# Patient Record
Sex: FEMALE | Race: WHITE | Employment: FULL TIME | URBAN - METROPOLITAN AREA
[De-identification: names, ages, dates, MRNs, and addresses within clinical notes are randomized per-mention and may not be internally consistent; named-entity substitution may affect disease eponyms.]

---

## 2017-02-14 ENCOUNTER — OFFICE VISIT (OUTPATIENT)
Dept: ORTHOPEDIC SURGERY | Age: 47
End: 2017-02-14

## 2017-02-14 ENCOUNTER — HOSPITAL ENCOUNTER (OUTPATIENT)
Dept: GENERAL RADIOLOGY | Facility: MEDICAL CENTER | Age: 47
Discharge: HOME | End: 2017-02-14
Attending: ORTHOPAEDIC SURGERY | Admitting: ORTHOPAEDIC SURGERY

## 2017-02-14 VITALS
BODY MASS INDEX: 29.99 KG/M2 | HEIGHT: 65 IN | SYSTOLIC BLOOD PRESSURE: 116 MMHG | WEIGHT: 180 LBS | HEART RATE: 58 BPM | DIASTOLIC BLOOD PRESSURE: 77 MMHG

## 2017-02-14 DIAGNOSIS — M22.41 CHONDROMALACIA PATELLAE OF RIGHT KNEE: ICD-10-CM

## 2017-02-14 DIAGNOSIS — M25.561 CHRONIC PAIN OF RIGHT KNEE: ICD-10-CM

## 2017-02-14 DIAGNOSIS — G89.29 CHRONIC PAIN OF RIGHT KNEE: ICD-10-CM

## 2017-02-14 DIAGNOSIS — S83.231A COMPLEX TEAR OF MEDIAL MENISCUS OF RIGHT KNEE AS CURRENT INJURY, INITIAL ENCOUNTER: ICD-10-CM

## 2017-02-14 DIAGNOSIS — G89.29 CHRONIC PAIN OF RIGHT KNEE: Primary | ICD-10-CM

## 2017-02-14 DIAGNOSIS — M25.561 CHRONIC PAIN OF RIGHT KNEE: Primary | ICD-10-CM

## 2017-02-14 PROCEDURE — 73562 X-RAY EXAM OF KNEE 3: CPT | Performed by: ORTHOPAEDIC SURGERY

## 2017-02-14 PROCEDURE — 99203 OFFICE O/P NEW LOW 30 MIN: CPT | Performed by: ORTHOPAEDIC SURGERY

## 2017-02-24 ENCOUNTER — OFFICE VISIT (OUTPATIENT)
Dept: ORTHOPEDIC SURGERY | Age: 47
End: 2017-02-24

## 2017-02-24 VITALS
HEIGHT: 65 IN | HEART RATE: 64 BPM | DIASTOLIC BLOOD PRESSURE: 89 MMHG | WEIGHT: 180 LBS | BODY MASS INDEX: 29.99 KG/M2 | SYSTOLIC BLOOD PRESSURE: 133 MMHG

## 2017-02-24 DIAGNOSIS — M25.561 CHRONIC PAIN OF RIGHT KNEE: Primary | ICD-10-CM

## 2017-02-24 DIAGNOSIS — M22.41 CHONDROMALACIA PATELLAE OF RIGHT KNEE: ICD-10-CM

## 2017-02-24 DIAGNOSIS — G89.29 CHRONIC PAIN OF RIGHT KNEE: Primary | ICD-10-CM

## 2017-02-24 PROCEDURE — 99213 OFFICE O/P EST LOW 20 MIN: CPT | Performed by: ORTHOPAEDIC SURGERY

## 2017-12-12 ENCOUNTER — OFFICE VISIT (OUTPATIENT)
Dept: INTERNAL MEDICINE CLINIC | Age: 47
End: 2017-12-12

## 2017-12-12 VITALS
HEIGHT: 66 IN | HEART RATE: 70 BPM | DIASTOLIC BLOOD PRESSURE: 79 MMHG | OXYGEN SATURATION: 100 % | SYSTOLIC BLOOD PRESSURE: 106 MMHG | BODY MASS INDEX: 25.68 KG/M2 | WEIGHT: 159.8 LBS

## 2017-12-12 DIAGNOSIS — Z13.21 ENCOUNTER FOR SCREENING FOR NUTRITIONAL DISORDER: ICD-10-CM

## 2017-12-12 DIAGNOSIS — G35 MULTIPLE SCLEROSIS (HCC): Primary | ICD-10-CM

## 2017-12-12 DIAGNOSIS — D35.2 PITUITARY ADENOMA (HCC): ICD-10-CM

## 2017-12-12 DIAGNOSIS — Z13.220 LIPID SCREENING: ICD-10-CM

## 2017-12-12 PROCEDURE — 99213 OFFICE O/P EST LOW 20 MIN: CPT | Performed by: INTERNAL MEDICINE

## 2017-12-12 ASSESSMENT — PATIENT HEALTH QUESTIONNAIRE - PHQ9
1. LITTLE INTEREST OR PLEASURE IN DOING THINGS: 0
SUM OF ALL RESPONSES TO PHQ QUESTIONS 1-9: 0
2. FEELING DOWN, DEPRESSED OR HOPELESS: 0
SUM OF ALL RESPONSES TO PHQ9 QUESTIONS 1 & 2: 0

## 2017-12-12 NOTE — PROGRESS NOTES
[address] Internal Medicine  Patient Encounter   Velia Thomas MD    2017    Jalen Kirkland  :1970       Assessment/Plan:    Multiple sclerosis ([facility]  In remission. Not supplementing Vitamin D. Check level. -     Vitamin D 25 Hydroxy; Future  -     Comprehensive Metabolic Panel; Future    Pituitary adenoma ([facility] [address] [facility]  Asymptomatic. Check labs  -     TSH with Reflex; Future  -     CBC; Future    Following ketogenic diet  Lipid screening  Encounter for screening for nutritional disorder  -     MAGNESIUM; Future  -     HEAVY METALS, BLOOD; Future  -     VITAMIN C; Future    Discussed medications with patient who voiced understanding of their use and indications. All questions answered. Follow-up 1 year/prn     Medical assistant triage:  Chief Complaint   Patient presents with    Follow-up     had flu shot with work, will schedule pap     HPI:   Patient presents for follow-up of   1. Multiple sclerosis ([facility] [facility]    2. Pituitary adenoma ([facility]    3. Lipid screening    4. Encounter for screening for nutritional disorder         Health assessment in March BP was 100/72. High today . MS is quiet. Dr. Maddie Leach retired and is now seeing Dr. Oli Ly. In full remission. Off medications. Low level anxiety about brain health. Parents stripped lead pant of woodwork as child. Started ketogenic diet mid-late September. Does include vegetables and some fruits. Feels great. Wants labs checked to be sure is not having negative impact. Has gradually lost weight although wasn't the intent for the diet. More about brain health. Wants to check some labs since following this diet. Lipid march   Chol  190  Trig60  hdl75  ldl  Blood glucose 104    ROS  As per HPI. Past Medical History:   Diagnosis Date    Allergic     Hypercholesterolemia 2017    Multiple sclerosis ([facility] [address] [facility]     Dr. Chad Vila    Neuromuscular disorder [facility])     Pituitary adenoma [facility])      Prior to Visit Medications    Medication Sig Taking?

## 2017-12-14 PROBLEM — E78.00 HYPERCHOLESTEROLEMIA: Status: ACTIVE | Noted: 2017-12-14

## 2019-01-08 LAB
CHOLESTEROL, TOTAL: 203 MG/DL
CHOLESTEROL/HDL RATIO: ABNORMAL
HDLC SERPL-MCNC: 97 MG/DL (ref 35–70)
LDL CHOLESTEROL CALCULATED: 95 MG/DL (ref 0–160)
TRIGL SERPL-MCNC: 55 MG/DL
VLDLC SERPL CALC-MCNC: ABNORMAL MG/DL

## 2019-03-13 ENCOUNTER — PATIENT MESSAGE (OUTPATIENT)
Dept: INTERNAL MEDICINE CLINIC | Age: 49
End: 2019-03-13

## 2019-04-16 ENCOUNTER — OFFICE VISIT (OUTPATIENT)
Dept: INTERNAL MEDICINE CLINIC | Age: 49
End: 2019-04-16
Payer: COMMERCIAL

## 2019-04-16 VITALS
WEIGHT: 152 LBS | BODY MASS INDEX: 24.91 KG/M2 | OXYGEN SATURATION: 98 % | SYSTOLIC BLOOD PRESSURE: 112 MMHG | DIASTOLIC BLOOD PRESSURE: 64 MMHG | HEART RATE: 60 BPM

## 2019-04-16 DIAGNOSIS — F41.9 ANXIETY: Primary | ICD-10-CM

## 2019-04-16 DIAGNOSIS — G35 MULTIPLE SCLEROSIS (HCC): ICD-10-CM

## 2019-04-16 DIAGNOSIS — E78.00 HYPERCHOLESTEROLEMIA: ICD-10-CM

## 2019-04-16 DIAGNOSIS — G47.00 INSOMNIA, UNSPECIFIED TYPE: ICD-10-CM

## 2019-04-16 PROCEDURE — 90632 HEPA VACCINE ADULT IM: CPT | Performed by: INTERNAL MEDICINE

## 2019-04-16 PROCEDURE — 90471 IMMUNIZATION ADMIN: CPT | Performed by: INTERNAL MEDICINE

## 2019-04-16 PROCEDURE — 99214 OFFICE O/P EST MOD 30 MIN: CPT | Performed by: INTERNAL MEDICINE

## 2019-04-16 RX ORDER — ALPRAZOLAM 0.25 MG/1
0.25 TABLET ORAL NIGHTLY PRN
Qty: 30 TABLET | Refills: 0 | Status: SHIPPED | OUTPATIENT
Start: 2019-04-16 | End: 2020-02-18 | Stop reason: SDUPTHER

## 2019-04-16 RX ORDER — EPINEPHRINE 0.3 MG/.3ML
0.3 INJECTION SUBCUTANEOUS ONCE
Qty: 0.3 ML | Refills: 0 | Status: SHIPPED | OUTPATIENT
Start: 2019-04-16 | End: 2019-04-16

## 2019-04-16 RX ORDER — AZITHROMYCIN 250 MG/1
TABLET, FILM COATED ORAL
Qty: 6 TABLET | Refills: 0 | Status: SHIPPED | OUTPATIENT
Start: 2019-04-16 | End: 2019-04-16

## 2019-04-16 RX ORDER — AZITHROMYCIN 500 MG/1
500 TABLET, FILM COATED ORAL DAILY
Qty: 3 TABLET | Refills: 0 | Status: SHIPPED | OUTPATIENT
Start: 2019-04-16 | End: 2019-04-19

## 2019-04-16 ASSESSMENT — PATIENT HEALTH QUESTIONNAIRE - PHQ9
SUM OF ALL RESPONSES TO PHQ QUESTIONS 1-9: 0
1. LITTLE INTEREST OR PLEASURE IN DOING THINGS: 0
2. FEELING DOWN, DEPRESSED OR HOPELESS: 0
SUM OF ALL RESPONSES TO PHQ9 QUESTIONS 1 & 2: 0
SUM OF ALL RESPONSES TO PHQ QUESTIONS 1-9: 0

## 2019-04-16 NOTE — PATIENT INSTRUCTIONS
Psychologist:  César Alvarado [phone]  Yudy Romo [phone]      [facility]  [phone]

## 2019-04-16 NOTE — PROGRESS NOTES
[facility] Primary Care  Internal Medicine Progress Note  Nuria Justin MD  2019    Socorro Doing  :1970    ASSESSMENT/PLAN:   Anxiety  Intermittent, with panic. Ok to continue to use xanax prn, however if frequency increase, would change to maintenance type medication.   -     TSH with Reflex; Future  -     ALPRAZolam (XANAX) 0.25 MG tablet; Take 1 tablet by mouth nightly as needed for Anxiety for up to 30 days. Insomnia, unspecified type  Relates to intermittent anxiety. Breathing and relaxation techniques reviewed. Xanax 0.25 mg prn. RX Monitoring 2019   Attestation The Prescription Monitoring Report for this patient was reviewed today. Chronic Pain Routine Monitoring No signs of potential drug abuse or diversion identified: otherwise, see note documentation     -     ALPRAZolam (XANAX) 0.25 MG tablet; Take 1 tablet by mouth nightly as needed for Anxiety for up to 30 days. Multiple sclerosis ([address]  Currently asymptomatic however has been encouraged to restart treatment preventivelty. Following with new neuro, Dr. Mesha Castro at [facility]. Hypercholesterolemia  Recent labs much improved. Travel  [place] are [place]  Hep A  zithromycin for travel  Epipen for shrimp allergy. Discussed medications with patient who voiced understanding of their use and indications. All questions answered. Medical assistant triage:   Chief Complaint   Patient presents with    Anxiety       HPI:   57 y.o. female here today for follow-up. Anxiety issues and not see in over a year. Had requested xanax refill. Initially prescribed when ad MS dx and uses for premed for MRI. Prior to MS, had panic attacks. Had 2 ER visits. Rarely has panic attacks now, but keeps prescription as security blanket. Lately doing ok during day but racing thoughts and sometimes even racing heart at night. Will get caught up in little thought loops. Happening a couple times/month. Will happen in spurts.     Job is demanding but loves it.  is alcoholic, and getting worse over time. New neurologist, at [facility]. Dr. Klaudia Tubbs. Doesn't like the practice and scheduling. Recent labs at work ad was told cholesterol was \"good\". Still mostly following Keto. Glucose was normal also. Had labs at [facility] in January. Lipids better. Pap scheduled for July. Remote travel in [place] this summer. Would like Abx, and epi pen since shrimp allergy. Review of Systems As per HPI. Prior to Visit Medications    Medication Sig Taking? Authorizing Provider   Cyanocobalamin (B-12 PO) Take by mouth Yes Historical Provider, MD   Cholecalciferol (VITAMIN D3) 5000 units TABS Take by mouth Yes Historical Provider, MD   MULTIPLE VITAMIN PO Take by mouth Yes Historical Provider, MD   BIOTIN PO Take by mouth Yes Historical Provider, MD   azithromycin (ZITHROMAX Z-LISA) 250 MG tablet Take 2 tablets daily on day 1, then 1 tablet daily on days 2-5 for traveler's diarrhea. Yes Hu Sheriff MD   EPINEPHrine (EPIPEN 2-LISA) 0.3 MG/0.3ML SOAJ injection Inject 0.3 mLs into the muscle once for 1 dose Use as directed for allergic reaction Yes Hu Sheriff MD   ALPRAZolam Shan Ten) 0.25 MG tablet Take 1 tablet by mouth nightly as needed for Anxiety for up to 30 days. Yes Hu Sheriff MD     Social History     Tobacco Use   Smoking Status Former Smoker    Types: Cigarettes    Last attempt to quit: 10/3/2002    Years since quittin.5   Smokeless Tobacco Never Used   Tobacco Comment    passive smoke exposure       OBJECTIVE:  BP Readings from Last 3 Encounters:   19 112/64   17 106/79   17 133/89      Wt Readings from Last 3 Encounters:   19 152 lb (68.9 kg)   17 159 lb 12.8 oz (72.5 kg)   17 180 lb (81.6 kg)     Vitals:    19 1028   BP: 112/64   Pulse: 60   SpO2: 98%   Weight: 152 lb (68.9 kg)     Body mass index is 24.91 kg/m².   Physical Exam

## 2019-05-20 ENCOUNTER — TELEPHONE (OUTPATIENT)
Dept: INTERNAL MEDICINE CLINIC | Age: 49
End: 2019-05-20

## 2019-05-20 NOTE — TELEPHONE ENCOUNTER
Pt had a very tight schedule and unfortunately office schedule did not work for her. I mentioned urgent care that may be convenient with her hours. She voiced understanding. She would zuleima lbk if her schedule opened up more.

## 2019-05-20 NOTE — TELEPHONE ENCOUNTER
Patient is requesting to be seen by Dr. Nolasco Pleasant Wednesday or Later this week. She states she has a Sinus infection, and used a netti pot, blew her nose, heard a pop, her hearing hasn't been the same since, and she is having notable pain in that ear (left)  She is fearful that she has a perforated ear drum, and wants this taken care of because [place] on [place]. Aware doctor  is out of the office today  Please contact patient @ phone # provided.

## 2020-03-04 ENCOUNTER — OFFICE VISIT (OUTPATIENT)
Dept: CARDIOLOGY CLINIC | Age: 50
End: 2020-03-04
Payer: COMMERCIAL

## 2020-03-04 VITALS
HEART RATE: 60 BPM | DIASTOLIC BLOOD PRESSURE: 60 MMHG | BODY MASS INDEX: 24.75 KG/M2 | WEIGHT: 151 LBS | SYSTOLIC BLOOD PRESSURE: 118 MMHG

## 2020-03-04 PROCEDURE — 99203 OFFICE O/P NEW LOW 30 MIN: CPT | Performed by: INTERNAL MEDICINE

## 2020-03-04 PROCEDURE — 93000 ELECTROCARDIOGRAM COMPLETE: CPT | Performed by: INTERNAL MEDICINE

## 2020-03-04 ASSESSMENT — ENCOUNTER SYMPTOMS
COUGH: 0
CHOKING: 0
SHORTNESS OF BREATH: 0
CHEST TIGHTNESS: 1

## 2020-03-04 NOTE — PROGRESS NOTES
file     Relationship status: Not on file    Intimate partner violence:     Fear of current or ex partner: Not on file     Emotionally abused: Not on file     Physically abused: Not on file     Forced sexual activity: Not on file   Other Topics Concern    Not on file   Social History Narrative    Not on file     FH reviewed. Mo valve issue. Review of Systems   Constitutional: Positive for fatigue. Negative for activity change and appetite change. Respiratory: Positive for chest tightness. Negative for cough, choking and shortness of breath. Cardiovascular: Negative for chest pain, palpitations and leg swelling. Denies PND or orthopnea. No tachycardia or syncope. Neurological: Negative for dizziness, syncope and light-headedness. Psychiatric/Behavioral: Negative for agitation, behavioral problems and confusion. All other systems reviewed and are negative. Objective:   Physical Exam  Constitutional:       General: She is not in acute distress. Appearance: Normal appearance. She is well-developed. HENT:      Head: Normocephalic and atraumatic. Right Ear: External ear normal.      Left Ear: External ear normal.   Neck:      Musculoskeletal: Normal range of motion. Vascular: No JVD. Cardiovascular:      Rate and Rhythm: Normal rate and regular rhythm. Heart sounds: Normal heart sounds. No murmur. No gallop. Pulmonary:      Effort: Pulmonary effort is normal. No respiratory distress. Breath sounds: Normal breath sounds. No wheezing or rales. Abdominal:      General: Bowel sounds are normal.      Palpations: Abdomen is soft. Tenderness: There is no abdominal tenderness. Musculoskeletal: Normal range of motion. Skin:     General: Skin is warm and dry. Neurological:      General: No focal deficit present. Mental Status: She is alert and oriented to person, place, and time.    Psychiatric:         Mood and Affect: Mood normal.         Behavior:

## 2020-09-01 ENCOUNTER — OFFICE VISIT (OUTPATIENT)
Dept: ORTHOPEDIC SURGERY | Age: 50
End: 2020-09-01
Payer: COMMERCIAL

## 2020-09-01 VITALS — BODY MASS INDEX: 24.27 KG/M2 | HEIGHT: 66 IN | WEIGHT: 151.01 LBS

## 2020-09-01 PROCEDURE — 99243 OFF/OP CNSLTJ NEW/EST LOW 30: CPT | Performed by: ORTHOPAEDIC SURGERY

## 2020-09-01 RX ORDER — HYDROCODONE BITARTRATE AND ACETAMINOPHEN 5; 325 MG/1; MG/1
1 TABLET ORAL EVERY 6 HOURS PRN
Qty: 28 TABLET | Refills: 0 | Status: SHIPPED | OUTPATIENT
Start: 2020-09-01 | End: 2020-09-08

## 2020-09-01 RX ORDER — CEPHALEXIN 500 MG/1
500 CAPSULE ORAL 4 TIMES DAILY
Qty: 8 CAPSULE | Refills: 0 | Status: SHIPPED | OUTPATIENT
Start: 2020-09-01 | End: 2020-09-03

## 2020-09-01 NOTE — PROGRESS NOTES
Chief Complaint    No chief complaint on file. History of Present Illness:  Sole Chandler is a 54 y.o. female who I was asked to see in consultation by Dr. Ray Prado for evaluation of they are chief complaint of right ankle pain. She states on 8/28/2020 she was hiking and she slipped on some allergy on an incline and twisted her right ankle. She felt a pop and had immediate pain laterally. She had isolated injury to the right ankle. She was seen by another physician and is now here for second opinion. She denies any other injuries and states that she is always clumsy but normally does a good job of falling. This time she did not. She is an     Medical History:  Patient's medications, allergies, past medical, surgical, social and family histories were reviewed and updated as appropriate. Review of Systems:  Pertinent items are noted in HPI  Review of systems reviewed from Patient History Form dated on 9/1/2020 and available in the patient's chart under the Media tab. Vital Signs: There were no vitals taken for this visit. General Exam:   Constitutional: Patient is adequately groomed with no evidence of malnutrition  DTRs: Deep tendon reflexes are intact  Mental Status: The patient is oriented to time, place and person. The patient's mood and affect are appropriate. Lymphatic: The lymphatic examination bilaterally reveals all areas to be without enlargement or induration. Ankle Examination:    Inspection: Mild swelling right ankle no fracture blisters    Palpation: Tenderness over the lateral ankle no pain over the medial side    Range of Motion: 10 degrees of dorsiflexion 30 degrees of plantarflexion    Strength: No focal deficit    Special Tests: No evidence of DVT or compartment syndrome    Skin: There are no rashes, ulcerations or lesions.     Gait: Antalgic    Reflex 2+ and symmetric    Additional Comments:       Additional Examinations:         Left Lower Extremity: Examination of the left lower extremity does not show any tenderness, deformity or injury. Range of motion is unremarkable. There is no gross instability. There are no rashes, ulcerations or lesions. Strength and tone are normal.     Radiology:     X-rays obtained and reviewed in office:  Views 3  Location right ankle  Impression obtained previously at another facility shows a distal fibula torus fracture    Assessment : This patient has a right distal fibula fracture      Office Procedures:  No orders of the defined types were placed in this encounter. Treatment Plan:  The etiology of distal fibula fracture was discussed in great detail including the nonoperative and operative options. All questions were answered. Nonoperative option includes activity modification, immobilization with cast or boot, support using brace shoes and inserts, medicines when appropriate, physical therapy, injections when indicated, and topicals. Operative option includes open reduction internal fixation right distal fibula fracture. The patient will start on the following treatment regimen: We discussed operative and nonoperative treatments. All questions were answered no guarantees were given or implied. I gave her Polysporin that she will use on the lateral ankle until her surgery day and will followup with me postop    I have evaluated the patient myself and completed the examination of the patient on date of visit.  Have discussed the case and reviewed all pertinent data with the patient

## 2020-09-02 ENCOUNTER — TELEPHONE (OUTPATIENT)
Dept: ORTHOPEDIC SURGERY | Age: 50
End: 2020-09-02

## 2020-09-02 NOTE — TELEPHONE ENCOUNTER
Auth: # [id number]    Date: 09/04/2020 - 11/04/2020  Type of SX:  OP  Location: [facility]  CPT: 84805   DX Code: K04.086Z  SX area: Orif right fibula fracture  [facility]

## 2020-09-02 NOTE — PROGRESS NOTES
Obstructive Sleep Apnea (PEDRO) Screening     Patient:  Randa Hughes    YOB: 1970      Medical Record #:  [id number]                     Date:  9/2/2020     1. Are you a loud and/or regular snorer? []  Yes       [x] No    2. Have you been observed to gasp or stop breathing during sleep? []  Yes       [x] No    3. Do you feel tired or groggy upon awakening or do you awaken with a headache?           []  Yes       [] No    4. Are you often tired or fatigued during the wake time hours? []  Yes       [] No    5. Do you fall asleep sitting, reading, watching TV or driving? []  Yes       [] No    6. Do you often have problems with memory or concentration? []  Yes       [] No    **If patient's score is ? 3 they are considered high risk for PEDRO. An Anesthesia provider will evaluate the patient and develop a plan of care the day of surgery. Note:  If the patient's BMI is more than 35 kg m¯² , has neck circumference > 40 cm, and/or high blood pressure the risk is greater (© American Sleep Apnea Association, 2006).

## 2020-09-03 ENCOUNTER — OFFICE VISIT (OUTPATIENT)
Dept: PRIMARY CARE CLINIC | Age: 50
End: 2020-09-03
Payer: COMMERCIAL

## 2020-09-03 ENCOUNTER — ANESTHESIA EVENT (OUTPATIENT)
Dept: OPERATING ROOM | Age: 50
End: 2020-09-03
Payer: COMMERCIAL

## 2020-09-03 LAB
ANION GAP SERPL CALCULATED.3IONS-SCNC: 10 MMOL/L (ref 3–16)
BUN BLDV-MCNC: 18 MG/DL (ref 7–20)
CALCIUM SERPL-MCNC: 9.5 MG/DL (ref 8.3–10.6)
CHLORIDE BLD-SCNC: 104 MMOL/L (ref 99–110)
CO2: 25 MMOL/L (ref 21–32)
CREAT SERPL-MCNC: 0.8 MG/DL (ref 0.6–1.1)
GFR AFRICAN AMERICAN: >60
GFR NON-AFRICAN AMERICAN: >60
GLUCOSE BLD-MCNC: 89 MG/DL (ref 70–99)
POTASSIUM SERPL-SCNC: 4.9 MMOL/L (ref 3.5–5.1)
SODIUM BLD-SCNC: 139 MMOL/L (ref 136–145)

## 2020-09-03 PROCEDURE — 99211 OFF/OP EST MAY X REQ PHY/QHP: CPT | Performed by: NURSE PRACTITIONER

## 2020-09-03 NOTE — PROGRESS NOTES
Shantal Lettyil received a viral test for COVID-19. They were educated on isolation and quarantine as appropriate. For any symptoms, they were directed to seek care from their PCP, given contact information to establish with a doctor, directed to an urgent care or the emergency room.

## 2020-09-03 NOTE — PATIENT INSTRUCTIONS

## 2020-09-04 ENCOUNTER — ANESTHESIA (OUTPATIENT)
Dept: OPERATING ROOM | Age: 50
End: 2020-09-04
Payer: COMMERCIAL

## 2020-09-04 ENCOUNTER — HOSPITAL ENCOUNTER (OUTPATIENT)
Age: 50
Setting detail: OUTPATIENT SURGERY
Discharge: HOME | End: 2020-09-04
Attending: ORTHOPAEDIC SURGERY | Admitting: ORTHOPAEDIC SURGERY
Payer: COMMERCIAL

## 2020-09-04 VITALS — SYSTOLIC BLOOD PRESSURE: 109 MMHG | DIASTOLIC BLOOD PRESSURE: 72 MMHG | OXYGEN SATURATION: 100 % | TEMPERATURE: 96.8 F

## 2020-09-04 VITALS
DIASTOLIC BLOOD PRESSURE: 77 MMHG | BODY MASS INDEX: 25.99 KG/M2 | RESPIRATION RATE: 16 BRPM | WEIGHT: 156 LBS | HEIGHT: 65 IN | TEMPERATURE: 98.4 F | SYSTOLIC BLOOD PRESSURE: 111 MMHG | OXYGEN SATURATION: 100 % | HEART RATE: 54 BPM

## 2020-09-04 LAB
PREGNANCY, URINE: NEGATIVE
SARS-COV-2, NAA: NOT DETECTED

## 2020-09-04 PROCEDURE — 84703 CHORIONIC GONADOTROPIN ASSAY: CPT

## 2020-09-04 PROCEDURE — 2500000003 HC RX 250 WO HCPCS: Performed by: NURSE ANESTHETIST, CERTIFIED REGISTERED

## 2020-09-04 PROCEDURE — C1713 ANCHOR/SCREW BN/BN,TIS/BN: HCPCS | Performed by: ORTHOPAEDIC SURGERY

## 2020-09-04 PROCEDURE — 7100000000 HC PACU RECOVERY - FIRST 15 MIN: Performed by: ORTHOPAEDIC SURGERY

## 2020-09-04 PROCEDURE — 3700000000 HC ANESTHESIA ATTENDED CARE: Performed by: ORTHOPAEDIC SURGERY

## 2020-09-04 PROCEDURE — 2580000003 HC RX 258: Performed by: ANESTHESIOLOGY

## 2020-09-04 PROCEDURE — 2500000003 HC RX 250 WO HCPCS: Performed by: ANESTHESIOLOGY

## 2020-09-04 PROCEDURE — 7100000010 HC PHASE II RECOVERY - FIRST 15 MIN: Performed by: ORTHOPAEDIC SURGERY

## 2020-09-04 PROCEDURE — 64445 NJX AA&/STRD SCIATIC NRV IMG: CPT | Performed by: ANESTHESIOLOGY

## 2020-09-04 PROCEDURE — 3600000013 HC SURGERY LEVEL 3 ADDTL 15MIN: Performed by: ORTHOPAEDIC SURGERY

## 2020-09-04 PROCEDURE — 7100000011 HC PHASE II RECOVERY - ADDTL 15 MIN: Performed by: ORTHOPAEDIC SURGERY

## 2020-09-04 PROCEDURE — 7100000001 HC PACU RECOVERY - ADDTL 15 MIN: Performed by: ORTHOPAEDIC SURGERY

## 2020-09-04 PROCEDURE — 6360000002 HC RX W HCPCS: Performed by: ANESTHESIOLOGY

## 2020-09-04 PROCEDURE — 6360000002 HC RX W HCPCS: Performed by: ORTHOPAEDIC SURGERY

## 2020-09-04 PROCEDURE — 6360000002 HC RX W HCPCS: Performed by: NURSE ANESTHETIST, CERTIFIED REGISTERED

## 2020-09-04 PROCEDURE — 64447 NJX AA&/STRD FEMORAL NRV IMG: CPT | Performed by: ANESTHESIOLOGY

## 2020-09-04 PROCEDURE — 2709999900 HC NON-CHARGEABLE SUPPLY: Performed by: ORTHOPAEDIC SURGERY

## 2020-09-04 PROCEDURE — 3700000001 HC ADD 15 MINUTES (ANESTHESIA): Performed by: ORTHOPAEDIC SURGERY

## 2020-09-04 PROCEDURE — 3600000003 HC SURGERY LEVEL 3 BASE: Performed by: ORTHOPAEDIC SURGERY

## 2020-09-04 DEVICE — SCREW BNE L24MM DIA3.5MM CORT ANK S STL NONLOCKING LO PROF: Type: IMPLANTABLE DEVICE | Site: ANKLE | Status: FUNCTIONAL

## 2020-09-04 DEVICE — SCREW BNE L14MM DIA3.5MM CORT ANK S STL LOK FULL THRD LO: Type: IMPLANTABLE DEVICE | Site: ANKLE | Status: FUNCTIONAL

## 2020-09-04 DEVICE — SCREW BNE L14MM DIA2.7MM ANK S STL LOK LO PROF FOR FRAC: Type: IMPLANTABLE DEVICE | Site: ANKLE | Status: FUNCTIONAL

## 2020-09-04 DEVICE — SCREW BNE L12MM DIA2.7MM ANK S STL LOK LO PROF FOR FRAC: Type: IMPLANTABLE DEVICE | Site: ANKLE | Status: FUNCTIONAL

## 2020-09-04 DEVICE — IMPLANTABLE DEVICE: Type: IMPLANTABLE DEVICE | Site: ANKLE | Status: FUNCTIONAL

## 2020-09-04 DEVICE — SCREW BNE L14MM DIA3.5MM CORT ANK S STL NONLOCKING LO PROF: Type: IMPLANTABLE DEVICE | Site: ANKLE | Status: FUNCTIONAL

## 2020-09-04 DEVICE — SCREW BNE L16MM DIA2.7MM ANK S STL LOK LO PROF FOR FRAC: Type: IMPLANTABLE DEVICE | Site: ANKLE | Status: FUNCTIONAL

## 2020-09-04 RX ORDER — FENTANYL CITRATE 50 UG/ML
25 INJECTION, SOLUTION INTRAMUSCULAR; INTRAVENOUS EVERY 5 MIN PRN
Status: DISCONTINUED | OUTPATIENT
Start: 2020-09-04 | End: 2020-09-04 | Stop reason: HOSPADM

## 2020-09-04 RX ORDER — DEXAMETHASONE SODIUM PHOSPHATE 4 MG/ML
INJECTION, SOLUTION INTRA-ARTICULAR; INTRALESIONAL; INTRAMUSCULAR; INTRAVENOUS; SOFT TISSUE PRN
Status: DISCONTINUED | OUTPATIENT
Start: 2020-09-04 | End: 2020-09-04 | Stop reason: SDUPTHER

## 2020-09-04 RX ORDER — BUPIVACAINE HYDROCHLORIDE AND EPINEPHRINE 5; 5 MG/ML; UG/ML
INJECTION, SOLUTION EPIDURAL; INTRACAUDAL; PERINEURAL PRN
Status: DISCONTINUED | OUTPATIENT
Start: 2020-09-04 | End: 2020-09-04 | Stop reason: SDUPTHER

## 2020-09-04 RX ORDER — SODIUM CHLORIDE, SODIUM LACTATE, POTASSIUM CHLORIDE, CALCIUM CHLORIDE 600; 310; 30; 20 MG/100ML; MG/100ML; MG/100ML; MG/100ML
INJECTION, SOLUTION INTRAVENOUS CONTINUOUS
Status: DISCONTINUED | OUTPATIENT
Start: 2020-09-04 | End: 2020-09-04 | Stop reason: HOSPADM

## 2020-09-04 RX ORDER — HYDRALAZINE HYDROCHLORIDE 20 MG/ML
5 INJECTION INTRAMUSCULAR; INTRAVENOUS EVERY 10 MIN PRN
Status: DISCONTINUED | OUTPATIENT
Start: 2020-09-04 | End: 2020-09-04 | Stop reason: HOSPADM

## 2020-09-04 RX ORDER — SODIUM CHLORIDE 0.9 % (FLUSH) 0.9 %
10 SYRINGE (ML) INJECTION EVERY 12 HOURS SCHEDULED
Status: DISCONTINUED | OUTPATIENT
Start: 2020-09-04 | End: 2020-09-04 | Stop reason: HOSPADM

## 2020-09-04 RX ORDER — PROMETHAZINE HYDROCHLORIDE 25 MG/ML
6.25 INJECTION, SOLUTION INTRAMUSCULAR; INTRAVENOUS
Status: DISCONTINUED | OUTPATIENT
Start: 2020-09-04 | End: 2020-09-04 | Stop reason: HOSPADM

## 2020-09-04 RX ORDER — PROPOFOL 10 MG/ML
INJECTION, EMULSION INTRAVENOUS CONTINUOUS PRN
Status: DISCONTINUED | OUTPATIENT
Start: 2020-09-04 | End: 2020-09-04 | Stop reason: SDUPTHER

## 2020-09-04 RX ORDER — ONDANSETRON 2 MG/ML
INJECTION INTRAMUSCULAR; INTRAVENOUS PRN
Status: DISCONTINUED | OUTPATIENT
Start: 2020-09-04 | End: 2020-09-04 | Stop reason: SDUPTHER

## 2020-09-04 RX ORDER — PROPOFOL 10 MG/ML
INJECTION, EMULSION INTRAVENOUS PRN
Status: DISCONTINUED | OUTPATIENT
Start: 2020-09-04 | End: 2020-09-04 | Stop reason: SDUPTHER

## 2020-09-04 RX ORDER — MIDAZOLAM HYDROCHLORIDE 1 MG/ML
INJECTION INTRAMUSCULAR; INTRAVENOUS PRN
Status: DISCONTINUED | OUTPATIENT
Start: 2020-09-04 | End: 2020-09-04 | Stop reason: SDUPTHER

## 2020-09-04 RX ORDER — LIDOCAINE HYDROCHLORIDE 10 MG/ML
1 INJECTION, SOLUTION EPIDURAL; INFILTRATION; INTRACAUDAL; PERINEURAL
Status: COMPLETED | OUTPATIENT
Start: 2020-09-04 | End: 2020-09-04

## 2020-09-04 RX ORDER — SODIUM CHLORIDE 0.9 % (FLUSH) 0.9 %
10 SYRINGE (ML) INJECTION PRN
Status: DISCONTINUED | OUTPATIENT
Start: 2020-09-04 | End: 2020-09-04 | Stop reason: HOSPADM

## 2020-09-04 RX ORDER — ONDANSETRON 2 MG/ML
4 INJECTION INTRAMUSCULAR; INTRAVENOUS
Status: DISCONTINUED | OUTPATIENT
Start: 2020-09-04 | End: 2020-09-04 | Stop reason: HOSPADM

## 2020-09-04 RX ORDER — FENTANYL CITRATE 50 UG/ML
INJECTION, SOLUTION INTRAMUSCULAR; INTRAVENOUS PRN
Status: DISCONTINUED | OUTPATIENT
Start: 2020-09-04 | End: 2020-09-04 | Stop reason: SDUPTHER

## 2020-09-04 RX ORDER — LIDOCAINE HYDROCHLORIDE 20 MG/ML
INJECTION, SOLUTION EPIDURAL; INFILTRATION; INTRACAUDAL; PERINEURAL PRN
Status: DISCONTINUED | OUTPATIENT
Start: 2020-09-04 | End: 2020-09-04 | Stop reason: SDUPTHER

## 2020-09-04 RX ORDER — OXYCODONE HYDROCHLORIDE AND ACETAMINOPHEN 5; 325 MG/1; MG/1
1 TABLET ORAL PRN
Status: DISCONTINUED | OUTPATIENT
Start: 2020-09-04 | End: 2020-09-04 | Stop reason: HOSPADM

## 2020-09-04 RX ORDER — MEPERIDINE HYDROCHLORIDE 50 MG/ML
12.5 INJECTION INTRAMUSCULAR; INTRAVENOUS; SUBCUTANEOUS EVERY 5 MIN PRN
Status: DISCONTINUED | OUTPATIENT
Start: 2020-09-04 | End: 2020-09-04 | Stop reason: HOSPADM

## 2020-09-04 RX ORDER — OXYCODONE HYDROCHLORIDE AND ACETAMINOPHEN 5; 325 MG/1; MG/1
2 TABLET ORAL PRN
Status: DISCONTINUED | OUTPATIENT
Start: 2020-09-04 | End: 2020-09-04 | Stop reason: HOSPADM

## 2020-09-04 RX ADMIN — BUPIVACAINE HYDROCHLORIDE AND EPINEPHRINE 10 ML: 5; 5 INJECTION, SOLUTION EPIDURAL; INTRACAUDAL; PERINEURAL at 12:53

## 2020-09-04 RX ADMIN — PROPOFOL 40 MG: 10 INJECTION, EMULSION INTRAVENOUS at 14:43

## 2020-09-04 RX ADMIN — MIDAZOLAM HYDROCHLORIDE 2 MG: 2 INJECTION, SOLUTION INTRAMUSCULAR; INTRAVENOUS at 12:49

## 2020-09-04 RX ADMIN — BUPIVACAINE HYDROCHLORIDE AND EPINEPHRINE 10 ML: 5; 5 INJECTION, SOLUTION EPIDURAL; INTRACAUDAL; PERINEURAL at 12:50

## 2020-09-04 RX ADMIN — SODIUM CHLORIDE, POTASSIUM CHLORIDE, SODIUM LACTATE AND CALCIUM CHLORIDE: 600; 310; 30; 20 INJECTION, SOLUTION INTRAVENOUS at 14:31

## 2020-09-04 RX ADMIN — ONDANSETRON 4 MG: 2 INJECTION INTRAMUSCULAR; INTRAVENOUS at 13:51

## 2020-09-04 RX ADMIN — PROPOFOL 40 MG: 10 INJECTION, EMULSION INTRAVENOUS at 14:40

## 2020-09-04 RX ADMIN — PROPOFOL 40 MG: 10 INJECTION, EMULSION INTRAVENOUS at 14:37

## 2020-09-04 RX ADMIN — BUPIVACAINE HYDROCHLORIDE AND EPINEPHRINE 10 ML: 5; 5 INJECTION, SOLUTION EPIDURAL; INTRACAUDAL; PERINEURAL at 12:51

## 2020-09-04 RX ADMIN — FENTANYL CITRATE 100 MCG: 50 INJECTION INTRAMUSCULAR; INTRAVENOUS at 12:49

## 2020-09-04 RX ADMIN — SODIUM CHLORIDE, POTASSIUM CHLORIDE, SODIUM LACTATE AND CALCIUM CHLORIDE: 600; 310; 30; 20 INJECTION, SOLUTION INTRAVENOUS at 12:56

## 2020-09-04 RX ADMIN — PROPOFOL 200 MG: 10 INJECTION, EMULSION INTRAVENOUS at 13:51

## 2020-09-04 RX ADMIN — PROPOFOL 20 MG: 10 INJECTION, EMULSION INTRAVENOUS at 14:34

## 2020-09-04 RX ADMIN — BUPIVACAINE HYDROCHLORIDE AND EPINEPHRINE 10 ML: 5; 5 INJECTION, SOLUTION EPIDURAL; INTRACAUDAL; PERINEURAL at 12:54

## 2020-09-04 RX ADMIN — SODIUM CHLORIDE, POTASSIUM CHLORIDE, SODIUM LACTATE AND CALCIUM CHLORIDE: 600; 310; 30; 20 INJECTION, SOLUTION INTRAVENOUS at 13:48

## 2020-09-04 RX ADMIN — PROPOFOL 150 MCG/KG/MIN: 10 INJECTION, EMULSION INTRAVENOUS at 14:13

## 2020-09-04 RX ADMIN — DEXAMETHASONE SODIUM PHOSPHATE 8 MG: 4 INJECTION, SOLUTION INTRAMUSCULAR; INTRAVENOUS at 13:51

## 2020-09-04 RX ADMIN — LIDOCAINE HYDROCHLORIDE 80 MG: 20 INJECTION, SOLUTION EPIDURAL; INFILTRATION; INTRACAUDAL; PERINEURAL at 13:51

## 2020-09-04 RX ADMIN — LIDOCAINE HYDROCHLORIDE 1 ML: 10 INJECTION, SOLUTION EPIDURAL; INFILTRATION; INTRACAUDAL; PERINEURAL at 12:56

## 2020-09-04 RX ADMIN — BUPIVACAINE HYDROCHLORIDE AND EPINEPHRINE 10 ML: 5; 5 INJECTION, SOLUTION EPIDURAL; INTRACAUDAL; PERINEURAL at 12:52

## 2020-09-04 RX ADMIN — CEFAZOLIN SODIUM 2 G: 10 INJECTION, POWDER, FOR SOLUTION INTRAVENOUS at 13:53

## 2020-09-04 ASSESSMENT — PULMONARY FUNCTION TESTS
PIF_VALUE: 11
PIF_VALUE: 10
PIF_VALUE: 10
PIF_VALUE: 3
PIF_VALUE: 10
PIF_VALUE: 10
PIF_VALUE: 0
PIF_VALUE: 1
PIF_VALUE: 10
PIF_VALUE: 3
PIF_VALUE: 10
PIF_VALUE: 10
PIF_VALUE: 3
PIF_VALUE: 10
PIF_VALUE: 13
PIF_VALUE: 1
PIF_VALUE: 0
PIF_VALUE: 12
PIF_VALUE: 0
PIF_VALUE: 10
PIF_VALUE: 2
PIF_VALUE: 15
PIF_VALUE: 10
PIF_VALUE: 0
PIF_VALUE: 3
PIF_VALUE: 10
PIF_VALUE: 0
PIF_VALUE: 12
PIF_VALUE: 2
PIF_VALUE: 10
PIF_VALUE: 0
PIF_VALUE: 2
PIF_VALUE: 12
PIF_VALUE: 10
PIF_VALUE: 2
PIF_VALUE: 3
PIF_VALUE: 10
PIF_VALUE: 2
PIF_VALUE: 10
PIF_VALUE: 0
PIF_VALUE: 0
PIF_VALUE: 2
PIF_VALUE: 12
PIF_VALUE: 3
PIF_VALUE: 2
PIF_VALUE: 10
PIF_VALUE: 0
PIF_VALUE: 10
PIF_VALUE: 12
PIF_VALUE: 10
PIF_VALUE: 2
PIF_VALUE: 10
PIF_VALUE: 11
PIF_VALUE: 11
PIF_VALUE: 0
PIF_VALUE: 10
PIF_VALUE: 2
PIF_VALUE: 12
PIF_VALUE: 0
PIF_VALUE: 2

## 2020-09-04 ASSESSMENT — PAIN SCALES - GENERAL
PAINLEVEL_OUTOF10: 0

## 2020-09-04 ASSESSMENT — LIFESTYLE VARIABLES: SMOKING_STATUS: 0

## 2020-09-04 ASSESSMENT — PAIN - FUNCTIONAL ASSESSMENT: PAIN_FUNCTIONAL_ASSESSMENT: 0-10

## 2020-09-04 NOTE — ANESTHESIA PRE PROCEDURE
Department of Anesthesiology  Preprocedure Note       Name:  Franklyn Bracket   Age:  54 y.o.  :  1970                                          MRN:  [id number]         Date:  2020      Surgeon: Liset Larsen MD    Procedure:  OPEN REDUCTION INTERNAL FIXATION RIGHT FIBULA FRACTURE WITH MINI C-ARM AND BLOCK FOR PAIN CONTROL    HPI:  54 y.o. female who was hiking and she slipped on some allergy on an incline and twisted her right ankle. She felt a pop and had immediate pain laterally. She had isolated injury to the right ankle. She was seen by another physician and is now here for second opinion. She denies any other injuries and states that she is always clumsy but normally does a good job of falling. This time she did not. She is an      Medications prior to admission:   HYDROcodone-acetaminophen (NORCO) 5-325 MG per tablet Take 1 tablet by mouth every 6 hours as needed for Pain   ALPRAZolam (XANAX) 0.25 MG tablet Take 1 tablet by mouth nightly as needed for Anxiety for up to 30 days.    Cyanocobalamin (B-12 PO) Take by mouth   Cholecalciferol (VITAMIN D3) 5000 units TABS Take by mouth   MULTIPLE VITAMIN PO Take by mouth   BIOTIN PO Take by mouth   EPINEPHrine (EPIPEN 2-LISA) 0.3 MG/0.3ML SOAJ injection Inject 0.3 mLs into the muscle once for 1 dose Use as directed for allergic reaction     Allergies:     Iv Contrast [Iodides] Hives     Problem List:     Multiple sclerosis ([facility] [address]- currently in remission    Chondromalacia patellae of right knee    Chronic pain of right knee    Hypercholesterolemia     Past Medical History:     Allergic     Hypercholesterolemia 2017    Multiple sclerosis ([facility]     Dr. Sam Khan Pituitary adenoma- small and being followed clinically 2020     Past Surgical History:     BREAST LUMPECTOMY       Social History:     Smoking status: Former Smoker     Types: Cigarettes     Last attempt to quit: 10/3/2002     Years since quittin.9    Smokeless tobacco: Never Used    Tobacco comment: passive smoke exposure   Substance Use Topics    Alcohol use: Yes     Types: 2 Glasses of wine, 2 Cans of beer, 1 Shots of liquor per week     Vital Signs (Current):    BP:  128/84 Pulse: 64    Resp: 14  SpO2: 100    Temp:  98.6 F (37C)   Height: 5' 5\" (1.651 m)  (20)  Weight: 156 lb (70.8 kg)  (20)    BMI: 26            BP Readings from Last 3 Encounters:   20 118/60   19 112/64   17 106/79     NPO Status: >8 hrs                        BMI:   Wt Readings from Last 3 Encounters:   20 155 lb (70.3 kg)   20 151 lb 0.2 oz (68.5 kg)   20 151 lb (68.5 kg)     Body mass index is 25.79 kg/m².     CMP:     2020    K 4.9 2020     2020    CO2 25 2020    BUN 18 2020    CREATININE 0.8 2020    GLUCOSE 89 2020    CALCIUM 9.5      HCG (If Applicable):negative    COVID-19 Screening (If Applicable): No results found for: COVID19      Anesthesia Evaluation  Patient summary reviewed and Nursing notes reviewed no history of anesthetic complications:   Airway: Mallampati: II  TM distance: >3 FB   Neck ROM: full  Mouth opening: > = 3 FB Dental:          Pulmonary: breath sounds clear to auscultation      (-) COPD, asthma, recent URI, sleep apnea, wheezes and not a current smoker                           Cardiovascular:  Exercise tolerance: good (>4 METS),   (+) hyperlipidemia    (-) hypertension, past MI,  angina,  SERNA and murmur    ECG reviewed  Rhythm: regular  Rate: normal                 ROS comment: EK  SBr 57; nl axis; no acute ischemic changes     Neuro/Psych:   (+) neuromuscular disease: multiple sclerosis,    (-) seizures, TIA and CVA           GI/Hepatic/Renal:        (-) GERD, hepatitis, liver disease and no renal disease       Endo/Other:    (+) : arthritis: OA., .    (-) hypothyroidism               Abdominal:           Vascular:     - DVT and PE. Anesthesia Plan      general     ASA 3     (AC and PF Block(s) for post-op pain management per surgeon request.)  Induction: intravenous. MIPS: Prophylactic antiemetics administered. Anesthetic plan and risks discussed with patient and spouse. Plan discussed with CRNA.           Cathleen Thorpe MD

## 2020-09-04 NOTE — PROGRESS NOTES
Taking po. No pain or nausea. . Toes mobile but ankle and foot numb from block. Elevated on pillow and ice applied.  to bedside.

## 2020-09-04 NOTE — ANESTHESIA PROCEDURE NOTES
Peripheral Block    Patient location during procedure: pre-op  Start time: 9/4/2020 12:49 PM  End time: 9/4/2020 12:54 PM  Staffing  Anesthesiologist: Criss Hinojosa MD  Performed: anesthesiologist   Preanesthetic Checklist  Completed: patient identified, site marked, surgical consent, pre-op evaluation, timeout performed, IV checked, risks and benefits discussed, monitors and equipment checked, anesthesia consent given, oxygen available and patient being monitored  Peripheral Block  Patient position: supine  Prep: ChloraPrep  Patient monitoring: cardiac monitor, continuous pulse ox, frequent blood pressure checks and IV access  Block type: Femoral  Laterality: right  Injection technique: single-shot  Procedures: ultrasound guided  Adductor canal  Provider prep: sterile gloves and mask  Needle  Needle gauge: 21 G  Needle length: 10 cm  Needle localization: ultrasound guidance  Test dose: negative  Assessment  Injection assessment: negative aspiration for heme, no paresthesia on injection and local visualized surrounding nerve on ultrasound  Paresthesia pain: none  Slow fractionated injection: yes  Hemodynamics: stable  Additional Notes  Pt. agrees to risks, benefits and alternatives to block  Block performed at the request of the surgeon for post-operative pain management   Immediately prior to procedure a \"time out\" was called to verify the correct patient, procedure, equipment, support staff. Site/side marked as required  Side: right  Site/Approach: anteromedial thigh approximately 10-15 cm from the inguinal crease.   Position: supine  Sedation: Midazolam 2 mg  +  Fentanyl  100  mcg  IV  Local Anesthetic Dose:  2% Lido  3 ml  Aseptic technique: prepped with chlorhexidine  Ultrasound:   yes, see attached image  Iliopsoas Muscle and Fascia Iliaca, Femoral artery (Deep artery to the thigh take off), Femoral Vein and Femoral Nerve are identified; the tip of the need and the spread of the local anesthetic around the Femoral nerve are visualized. The Femoral nerve appeared to be anatomically normal and there were no abnormal pathologically findings seen. Local Anesthetic: 0.5% Bupivacaine with Epi 1:200K  Amount: 20 ml  in 5 ml increments after negative aspiration each time. Easy injection w/o resistance and w/o pain/paresthesias. Pt tolerated procedure well. No complications.   Reason for block: post-op pain management and at surgeon's request

## 2020-09-04 NOTE — ANESTHESIA PROCEDURE NOTES
Peripheral Block    Patient location during procedure: pre-op  Start time: 9/4/2020 12:49 PM  End time: 9/4/2020 12:54 PM  Staffing  Anesthesiologist: Kb Shea MD  Performed: anesthesiologist   Preanesthetic Checklist  Completed: patient identified, site marked, surgical consent, pre-op evaluation, timeout performed, IV checked, risks and benefits discussed, monitors and equipment checked, anesthesia consent given, oxygen available and patient being monitored  Peripheral Block  Patient position: supine  Prep: ChloraPrep  Patient monitoring: cardiac monitor, continuous pulse ox, frequent blood pressure checks and IV access  Block type: Sciatic  Laterality: right  Injection technique: single-shot  Procedures: ultrasound guided  Popliteal  Provider prep: mask and sterile gloves  Needle  Needle gauge: 21 G  Needle length: 10 cm  Needle localization: ultrasound guidance  Test dose: negative  Assessment  Injection assessment: negative aspiration for heme, no paresthesia on injection and local visualized surrounding nerve on ultrasound  Paresthesia pain: none  Slow fractionated injection: yes  Hemodynamics: stable  Additional Notes  Pt. agrees to risks, benefits and alternatives to block  Block performed at the request of the surgeon for post-operative pain management   Immediately prior to procedure a \"time out\" was called to verify the correct patient, procedure, equipment, support staff. Site/side marked as required  Side: right  Site/Approach: lateral thigh 5-10 cm superior to the PF crease  Position: supine with leg elevated on blankets  Sedation: Midazolam 2 mg  +  Fentanyl  100  mcg  IV  Local Anesthetic Dose:  Lido 2%    3 ml sc prior to each block  Aseptic technique: prepped with chlorhexidine  Ultrasound:   yes- see attached image   Local Anesthetic: 0.5% Bupivacaine with Epi 1:200K Amount:  30 ml in 5 ml increments after negative aspiration each time.   Easy injection w/o resistance and w/o pain/paresthesias. Pt tolerated procedure well. No complications.   Reason for block: post-op pain management and at surgeon's request

## 2020-09-04 NOTE — PROGRESS NOTES
Report and transfer of care given to [facility]. Patient ready to prepare for discharge. Francesco Hensley

## 2020-09-04 NOTE — BRIEF OP NOTE
Brief Postoperative Note      Patient: Aarti Grimaldo  YOB: 1970  MRN: [id number]    Date of Procedure: 9/4/2020    Pre-Op Diagnosis: RIGHT FIBULA FRACTURE    Post-Op Diagnosis: Same       Procedure(s):  OPEN REDUCTION INTERNAL FIXATION RIGHT FIBULA FRACTURE WITH MINI C-ARM AND BLOCK FOR PAIN CONTROL                     [address]    Surgeon(s):  Alida Cassidy MD    Assistant:  Surgical Assistant: Ramiro Mckenzie    Anesthesia: General    Estimated Blood Loss (mL): Minimal    Complications: None    Specimens:   * No specimens in log *    Implants:  Implant Name Type Inv.  Item Serial No.  Lot No. LRB No. Used Action   PLATE LK DISTL FIB 4HL RT Screw/Plate/Nail/Jerrod PLATE LK DISTL FIB 4HL RT  [employer]  Right 1 Implanted   SCREW LPROF LK SS 2.7X12MM Screw/Plate/Nail/Jerrod SCREW LPROF LK SS 2.7X12MM  [employer]  Right 1 Implanted   SCREW LPROF LK SS 2.7X12MM Screw/Plate/Nail/Jerrod SCREW LPROF LK SS 2.7X12MM  [employer]  Right 2 Implanted   SCREW LK LPROF SS 2.7X14MM Screw/Plate/Nail/Jerrod SCREW LK LPROF SS 2.7X14MM  [employer]  Right 1 Implanted   SCREW LK LPROF SS 2.7X16MM Screw/Plate/Nail/Jerrod SCREW LK LPROF SS 2.7X16MM  [employer]  Right 1 Implanted   SCREW TIARA LPROF NONLK STT 3.5X14MM Screw/Plate/Nail/Jerrod SCREW TIARA LPROF NONLK STT 3.5X14MM  [employer]  Right 1 Implanted   SCREW LOW PROFILE TIARA 3.9XPW53CH Screw/Plate/Nail/Jerrod SCREW LOW PROFILE TIARA 3.1OEO71YQ  [employer]  Right 1 Implanted   SCREW LK LPROF SS 3.5X14MM Screw/Plate/Nail/Jerrod SCREW LK LPROF SS 3.5X14MM  [employer]  Right 2 Implanted         Drains: * No LDAs found *    Findings: Right distal fibula fracture    Electronically signed by Alida Cassidy MD on 9/4/2020 at 2:50 PM

## 2020-09-04 NOTE — ANESTHESIA POSTPROCEDURE EVALUATION
Department of Anesthesiology  Postprocedure Note    Patient: Butch Rudd  MRN: [id number]  YOB: 1970  Date of evaluation: 9/4/2020    Procedure Summary     Date:  09/04/20 Room / Location:  [address]    Anesthesia Start:  2259 Anesthesia Stop:  6715    Procedure:  OPEN REDUCTION INTERNAL FIXATION RIGHT FIBULA FRACTURE WITH MINI C-ARM AND BLOCK FOR PAIN CONTROL                     [id number] (Right ) Diagnosis:       Closed torus fracture of distal end of right fibula, initial encounter      (RIGHT FIBULA FRACTURE)    Surgeon:  Francesca Arshad MD Responsible Provider:  Andra Rayo MD    Anesthesia Type:  general ASA Status:  3        Anesthesia Type: No value filed. Steph Phase I: Steph Score: 10    Steph Phase II: Steph Score: 10    Last vitals: Reviewed and per EMR flowsheets.      Anesthesia Post Evaluation   Anesthetic Problems: no   Cardiovascular System Stable: yes  Respiratory Function: Airway Patent yes  ETT no  Ventilator no  Level of consciousness: awake, alert and oriented  Post-op pain: adequate analgesia  Hydration Adequate: yes  Nausea/Vomiting:no  Other Issues:     Zahira Velasquez MD

## 2020-09-05 NOTE — OP NOTE
[facility]                                OPERATIVE REPORT    PATIENT NAME: Cullen Sr                      :        1970  MED REC NO:   [id number]                          ROOM:  ACCOUNT NO:   [de-identified]                           ADMIT DATE: 2020  PROVIDER:     Stefanie Key MD    DATE OF PROCEDURE:  2020    PREOPERATIVE DIAGNOSIS:  Right distal fibula fracture. POSTOPERATIVE DIAGNOSIS:  Right distal fibula fracture. OPERATION PERFORMED:  Open reduction internal fixation right distal  fibula fracture using Arthrex distal fibular locking plate. PRIMARY SURGEON:  Stefanie Key MD    ANESTHESIA:  General with block. DRAINS:  None. TUBES:  None. SPECIMENS:  None. ESTIMATED BLOOD LOSS:  Less than 50 mL. TOURNIQUET TIME:  Less than 1 hour. INDICATIONS:  The patient is a 53-year-old female who was hiking and  slipped down a hill, sustaining an injury to her right ankle. She was  found to have a distal fibula fracture. Now presents for open reduction  internal fixation. Risks and benefits of surgery were explained to the  patient. Informed consent was signed in the chart prior to surgery. OPERATIVE PROCEDURE:  The patient was brought into the operating room  and after adequate general anesthesia was placed in the supine position. Nonsterile tourniquet was placed around the proximal aspect of her right  upper thigh. Her right lower extremity was prepped and draped in a  sterile fashion. Leg was exsanguinated, tourniquet inflated to 350  mmHg. At this point, a longitudinal incision was made extending from  the tip of the fibula proximally, it was carried down through the  subcutaneous tissue using Metzenbaum scissors. The fracture was  identified, cleaned of soft tissue and hematoma after the superficial  peroneal nerve was identified and retracted anteriorly.   The fracture  was copiously irrigated, reduced, held with a tenaculum while 3.5  cortical lag screw was placed from proximal-anterior to  distal-posterior. Excellent compression was noted. The distal fibular  locking plate was contoured appropriately and placed along the lateral  aspect of the fibula and drilled and filled in usual AO fashion using  2.7 Arthrex locking screws distally and 3.5 cortical and locking screws  proximally. The wound was copiously irrigated with normal saline. A  hook test was performed on the syndesmosis and the syndesmosis was noted  to be stable, so no syndesmotic fixation was performed. The wound was  then closed using 2-0 Vicryl suture for the deep fascia and periosteum,  3-0 Vicryl suture inverted fashion for the subcutaneous tissue, 4-0  Monocryl running subcuticular stitch for the skin. Area was dressed  with a ZipLine, dry sterile dressing, sterile Webril, ABDs, placed into  a well-padded posterior splint. Tourniquet was deflated after less than  1 hour. Toes were noted to pink up nicely. The patient was awake in  the operating room, brought to the PACU in good condition. ADDENDUM:  Three views of the ankle were obtained multiple times  throughout the procedure. Final images were obtained, read by me and  showed that the fracture had been anatomically reduced, held with plate  and screws. The mortise was anatomically reduced.         Jerrod Figueroa MD    D: 09/04/2020 14:53:57       T: 09/04/2020 15:06:32     [email or web address]  Job#: [id number]     Doc#: [id number]    CC:

## 2020-09-17 NOTE — PROGRESS NOTES
Subjective: Patient is here for follow-up of her 9/4/2020 right distal fibula fracture open reduction internal fixation. She states she has no pain just some swelling at times. Denies numbness or tingling no fever chills  Objective: Physical exam shows incision looks good no evidence of infection sensations intact is 10 degrees of dorsiflexion 30 to 40 degrees of plantarflexion strength is at least 3+/5 no evidence of DVT  Imaging: 3 views of the right ankle show the fibula fracture well aligned mortise is well reduced  Assessment and plan: This patient is doing well I put her in a high tide boot Ace bandage and this is early.   I gave her the weightbearing handout she can weight-bear starting in 3 weeks I will see her back in for repeat her x-rays get her started in therapy and get her into a brace

## 2020-09-18 ENCOUNTER — OFFICE VISIT (OUTPATIENT)
Dept: ORTHOPEDIC SURGERY | Age: 50
End: 2020-09-18
Payer: COMMERCIAL

## 2020-09-18 ENCOUNTER — TELEPHONE (OUTPATIENT)
Dept: ORTHOPEDIC SURGERY | Age: 50
End: 2020-09-18

## 2020-09-18 VITALS — HEIGHT: 65 IN | WEIGHT: 156.09 LBS | BODY MASS INDEX: 26.01 KG/M2

## 2020-09-18 PROCEDURE — 99024 POSTOP FOLLOW-UP VISIT: CPT | Performed by: ORTHOPAEDIC SURGERY

## 2020-09-18 PROCEDURE — L4361 PNEUMA/VAC WALK BOOT PRE OTS: HCPCS | Performed by: ORTHOPAEDIC SURGERY

## 2020-09-18 NOTE — TELEPHONE ENCOUNTER
9/18/20 [facility] [place]    -  NO PRECERT REQUIRED - PER KEILY @ [facility]    REF # [id number]  MP

## 2020-10-05 NOTE — PROGRESS NOTES
Subjective: Patient is here for follow-up of her 9/4/2020 right ankle distal fibula fracture open reduction internal fixation. She states she has little to no pain. She is been full weightbearing in her boot but has been driving over the past few days. Objective: Physical exam shows incisions completely healed no evidence of infection she has mild swelling over the lateral ankle with mild tenderness to palpation throughout the lateral ankle no one place in particular. Has 10 degrees dorsiflexion 50 degrees of plantarflexion strength is 4-/5 with obvious calf atrophy no evidence of DVT  Imaging: 3 views of the right ankle show the fibula fracture healing nicely no change in position  Assessment and plan: Overall this patient is doing well she will start physical therapy I gave her a Allegra brace to go she will follow-up with me in 6 weeks repeat x-rays.   She would like to get back to hiking as soon as possible

## 2020-10-06 ENCOUNTER — OFFICE VISIT (OUTPATIENT)
Dept: ORTHOPEDIC SURGERY | Age: 50
End: 2020-10-06
Payer: COMMERCIAL

## 2020-10-06 VITALS — BODY MASS INDEX: 26.01 KG/M2 | WEIGHT: 156.09 LBS | HEIGHT: 65 IN

## 2020-10-06 PROCEDURE — 99024 POSTOP FOLLOW-UP VISIT: CPT | Performed by: ORTHOPAEDIC SURGERY

## 2020-10-06 PROCEDURE — L1902 AFO ANKLE GAUNTLET PRE OTS: HCPCS | Performed by: ORTHOPAEDIC SURGERY

## 2020-10-23 ENCOUNTER — HOSPITAL ENCOUNTER (OUTPATIENT)
Dept: PHYSICAL THERAPY | Age: 50
Setting detail: THERAPIES SERIES
Discharge: HOME | End: 2020-10-23
Payer: COMMERCIAL

## 2020-10-23 PROCEDURE — 97110 THERAPEUTIC EXERCISES: CPT | Performed by: PHYSICAL THERAPIST

## 2020-10-23 PROCEDURE — 97161 PT EVAL LOW COMPLEX 20 MIN: CPT | Performed by: PHYSICAL THERAPIST

## 2020-10-23 PROCEDURE — 97112 NEUROMUSCULAR REEDUCATION: CPT | Performed by: PHYSICAL THERAPIST

## 2020-10-23 NOTE — PLAN OF CARE
[facility], [address], [address] [place] [address]  Phone: [phone]  Fax [phone]     Physical Therapy Certification    Dear Referring Practitioner: Dr. Maricarmen Leo,    We had the pleasure of evaluating the following patient for physical therapy services at [address] [place] [address]. A summary of our findings can be found in the initial assessment below. This includes our plan of care. If you have any questions or concerns regarding these findings, please do not hesitate to contact me at the office phone number checked above. Thank you for the referral.       Physician Signature:_______________________________Date:__________________  By signing above (or electronic signature), therapists plan is approved by physician    Patient: Antonia Melgar   : 1970   MRN: [id number]  Referring Physician: Referring Practitioner: Dr. Maricarmen Leo      Evaluation Date: 10/23/2020      Medical Diagnosis Information:  Diagnosis: Z42.884Y (ICD-10-CM) - Closed torus fracture of distal end of right fibula, initial encounter   Treatment Diagnosis: B05.476U (ICD-10-CM) - Closed torus fracture of distal end of right fibula, initial encounter s/p R ankle ORIF 20                                         Insurance information: PT Insurance Information: [employer]       Precautions/ Contra-indications:     C-SSRS Triggered by Intake questionnaire (Past 2 wk assessment):   [x] No, Questionnaire did not trigger screening.   [] Yes, Patient intake triggered further evaluation      [] C-SSRS Screening completed  [] PCP notified via Plan of Care  [] Emergency services notified     Latex Allergy:  [x]NO      []YES  Preferred Language for Healthcare:   [x]English       []other:    SUBJECTIVE: Patient stated complaint: Reports hiking in [facility] and fell and fractured ankle on 20. Patient s/p  Torus fracture of fibula and s/p ORIF R ankle on 20.   Was in splint / walking boot and NWB for several weeks. Placed in [address] on 10/6/20. Reports currently not using brace or crutches unless going to work or out. Relevant Medical History: MS x 18 years ago. 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 Living Status/Prior Level of Function: Independent with ADLs and IADLs,  Lives in house . 3 story home. Works from home mostly. Yoga/hiking/bar/pilates. ( [employer])    OBJECTIVE:     ROM LEFT RIGHT   HIP Flex WFL WFL   HIP Abd     HIP Ext     HIP IR     HIP ER     Knee ext     Knee Flex     Ankle PF 65 60   Ankle DF +5 +5   Ankle In 45 40   Ankle Ev 15 10   Strength  LEFT RIGHT   HIP Flexors     HIP Abductors 4/5 4/5   HIP Ext     Hip ER     Knee EXT (quad)     Knee Flex (HS)     Ankle DF 4/5 4/5   Ankle PF 4/5 4/5   Ankle Inv 4-/5 4-/5   Ankle EV 4-/5 4-/5        Circumference  Mid apex  7 cm prox             Reflexes/Sensation:    []Dermatomes/Myotomes intact    [x]Reflexes equal and normal bilaterally   []Other:    Joint mobility:    []Normal    [x]Hypo   []Hyper    Palpation: minimal tenderness with palpation along fibula    Functional Mobility/Transfers: IND    Posture: WFL    Bandages/Dressings/Incisions: incision intact and well healed    Gait: (include devices/WB status) Slight Decreased heel to toe gait pattern. Orthopedic Special Tests: Nt                       [x] Patient history, allergies, meds reviewed. Medical chart reviewed. See intake form. Review Of Systems (ROS):  [x]Performed Review of systems (Integumentary, CardioPulmonary, Neurological) by intake and observation.  Intake form has been scanned into medical record. Patient has been instructed to contact their primary care physician regarding ROS issues if not already being addressed at this time. Co-morbidities/Complexities (which will affect course of rehabilitation):   []None           Arthritic conditions   []Rheumatoid arthritis (M05.9)  []Osteoarthritis (M19.91)   Cardiovascular conditions   []Hypertension (I10)  []Hyperlipidemia (E78.5)  []Angina pectoris (I20)  []Atherosclerosis (I70)   Musculoskeletal conditions   []Disc pathology   []Congenital spine pathologies   []Prior surgical intervention  []Osteoporosis (M81.8)  []Osteopenia (M85.8)   Endocrine conditions   []Hypothyroid (E03.9)  []Hyperthyroid Gastrointestinal conditions   []Constipation (S45.72)   Metabolic conditions   []Morbid obesity (E66.01)  []Diabetes type 1(E10.65) or 2 (E11.65)   []Neuropathy (G60.9)     Pulmonary conditions   []Asthma (J45)  []Coughing   []COPD (J44.9)   Psychological Disorders  []Anxiety (F41.9)  []Depression (F32.9)   []Other:   [x]Other:   MS       Barriers to/and or personal factors that will affect rehab potential:              []Age  []Sex              []Motivation/Lack of Motivation                        [x]Co-Morbidities              []Cognitive Function, education/learning barriers              []Environmental, home barriers              []profession/work barriers  []past PT/medical experience  []other:  Justification:     Falls Risk Assessment (30 days):   [x] Falls Risk assessed and no intervention required.   [] Falls Risk assessed and Patient requires intervention due to being higher risk   TUG score (>12s at risk):     [] Falls education provided, including           ASSESSMENT:   Functional Impairments:     []Noted lumbar/proximal hip/LE joint hypomobility   [x]Decreased LE functional ROM   []Decreased core/proximal hip strength and neuromuscular control   [x]Decreased LE functional strength   [x]Reduced balance/proprioceptive hold - 2x daily - 7x weekly   Gastroc Stretch on Wall - 5 reps - 1 sets - 30 hold - 2x daily - 7x weekly   Soleus Stretch on Wall - 5 reps - 1 sets - 30 hold - 2x daily - 7x weekly   Seated Toe Raise - 10 reps - 2 sets - 5 hold - 2x daily - 7x weekly   Seated Heel Raise - 10 reps - 2 sets - 5 hold - 2x daily - 7x weekly   Seated Great Toe Extension - 10 reps - 2 sets - 10 hold - 2x daily - 7x weekly   Seated Lesser Toes Extension - 10 reps - 2 sets - 10 hold - 2x daily - 7x weekly   Ankle Plantar Flexion with Resistance - 10 reps - 3 sets - 2 hold - 1-2x daily - 7x weekly   Long Sitting Ankle Eversion with Resistance - 10 reps - 3 sets - 2 hold - 1-2x daily - 7x weekly   Ankle Inversion with Resistance - 10 reps - 3 sets - 2 hold - 1-2x daily - 7x weekly   Ankle Dorsiflexion with Resistance - 10 reps - 3 sets - 2 hold - 1-2x daily - 7x weekly   Standing Calf Stretch - 5 reps - 1 sets - 30 hold - 1x daily - 7x weekly   Standing Soleus Stretch - 5 reps - 1 sets - 30 hold - 1x daily - 7x weekly   Standing Heel Raise with Chair Support - 10 reps - 3 sets - 1x daily - 7x weekly   Standing Toe Raises at Chair - 10 reps - 3 sets - 2 hold - 1x daily - 7x weekly   Single Leg Stance - 5 reps - 1 sets - 30 hold - 1x daily - 7x weekly   Single Leg Stance on Foam Pad - 1 reps - 5 sets - 30 hold - 1x daily - 7x weekly   Standing Eccentric Heel Raise - 10 reps - 3 sets - 1x daily - 7x weekly     GOALS:  Patient stated goal: return to hiking  [] Progressing: [] Met: [] Not Met: [] Adjusted    Therapist goals for Patient:    Goals: To be achieved in: 8 weeks  1. Independent in HEP and progression per patient tolerance, in order to prevent re-injury. [] Progressing: [] Met: [] Not Met: [] Adjusted  2. Patient will have a decrease in pain to facilitate improvement in movement, function, and ADLs as indicated by Functional Deficits.   [] Progressing: [] Met: [] Not Met: [] Adjusted    LElectronically signed by:  [place] [id number]

## 2020-10-23 NOTE — FLOWSHEET NOTE
[facility], [address]  Phone: [phone]  Fax [phone]    Physical Therapy Treatment Note/ Progress Report:           Date:  10/23/2020    Patient Name:  Clemente Ceballos    :  1970  MRN: [id number]  Restrictions/Precautions:    Medical/Treatment Diagnosis Information:  · Diagnosis: S82.821A (ICD-10-CM) - Closed torus fracture of distal end of right fibula, initial encounter  · Treatment Diagnosis: S82.821A (ICD-10-CM) - Closed torus fracture of distal end of right fibula, initial encounter s/p R ankle ORIF 13  Insurance/Certification information:  PT Insurance Information: [employer]/? auth/visits  Physician Information:  Referring Practitioner: Dr. Kirstie Todd  Has the plan of care been signed (Y/N):        []  Yes  [x]  No     Date of Patient follow up with Physician: 20      Is this a Progress Report:     []  Yes  [x]  No        If Yes:  Date Range for reporting period:  Bphwpiwes33/23/20  Ending:    Progress report will be due (10 Rx or 30 days whichever is less):        Recertification will be due (POC Duration  / 90 days whichever is less): 8 weeks      Visit # Insurance Allowable Auth Required   In-person 1 ?  []  Yes ???? []  No    Telehealth   []  Yes []  No    Total          Functional Scale: LEFS: 63/80 21%    Date assessed:  10/23/20      Therapy Diagnosis/Practice Pattern:4I      Number of Comorbidities:  []0     [x]1-2    []3+    Latex Allergy:  [x]NO      []YES  Preferred Language for Healthcare:   [x]English       []other:      Pain level:  1-610     SUBJECTIVE:  See eval    OBJECTIVE: See eval   Observation:    Test measurements:      RESTRICTIONS/PRECAUTIONS: Has MS    Exercises/Interventions:     Therapeutic Ex (30789) Sets/sec Reps Notes/CUES   See below for HEP instruction/progression 15'                                                                       Manual Intervention (67696)      T/C joint mobs in WB/NWB 5'                                   NMR re-education (19362)   CUES NEEDED   Patient education regarding progression of walking , use of ankle brace with unstable surfaces and building up endurance to activities 15'                                                     Therapeutic Activity (88118)                                            HEP instruction: (see scanned forms)  Access Code: [email or web address]   URL: [email or web address]   Date: 10/23/2020   Prepared by: Yi Arceo     Exercises   · Long Sitting Calf Stretch with Strap - 5 reps - 1 sets - 30 hold - 2x daily - 7x weekly   · Gastroc Stretch on Wall - 5 reps - 1 sets - 30 hold - 2x daily - 7x weekly   · Soleus Stretch on Wall - 5 reps - 1 sets - 30 hold - 2x daily - 7x weekly   · Seated Toe Raise - 10 reps - 2 sets - 5 hold - 2x daily - 7x weekly   · Seated Heel Raise - 10 reps - 2 sets - 5 hold - 2x daily - 7x weekly   · Seated Great Toe Extension - 10 reps - 2 sets - 10 hold - 2x daily - 7x weekly   · Seated Lesser Toes Extension - 10 reps - 2 sets - 10 hold - 2x daily - 7x weekly   · Ankle Plantar Flexion with Resistance - 10 reps - 3 sets - 2 hold - 1-2x daily - 7x weekly   · Long Sitting Ankle Eversion with Resistance - 10 reps - 3 sets - 2 hold - 1-2x daily - 7x weekly   · Ankle Inversion with Resistance - 10 reps - 3 sets - 2 hold - 1-2x daily - 7x weekly   · Ankle Dorsiflexion with Resistance - 10 reps - 3 sets - 2 hold - 1-2x daily - 7x weekly   · Standing Calf Stretch - 5 reps - 1 sets - 30 hold - 1x daily - 7x weekly   · Standing Soleus Stretch - 5 reps - 1 sets - 30 hold - 1x daily - 7x weekly   · Standing Heel Raise with Chair Support - 10 reps - 3 sets - 1x daily - 7x weekly   · Standing Toe Raises at Chair - 10 reps - 3 sets - 2 hold - 1x daily - 7x weekly   · Single Leg Stance - 5 reps - 1 sets - 30 hold - 1x daily - 7x weekly   · Single Leg Stance on Foam Pad - 1 reps - 5 sets - 30 hold - 1x daily - 7x weekly   · Standing Eccentric Heel Raise - 10 reps - 3 sets - 1x daily - 7x weekly        Therapeutic Exercise and NMR EXR  [x] (93699) Provided verbal/tactile cueing for activities related to strengthening, flexibility, endurance, ROM for improvements in LE, proximal hip, and core control with self care, mobility, lifting, ambulation.  [] (27224) Provided verbal/tactile cueing for activities related to improving balance, coordination, kinesthetic sense, posture, motor skill, proprioception  to assist with LE, proximal hip, and core control in self care, mobility, lifting, ambulation and eccentric single leg control.      NMR and Therapeutic Activities:    [x] (86939 or 58130) Provided verbal/tactile cueing for activities related to improving balance, coordination, kinesthetic sense, posture, motor skill, proprioception and motor activation to allow for proper function of core, proximal hip and LE with self care and ADLs  [] (65589) Gait Re-education- Provided training and instruction to the patient for proper LE, core and proximal hip recruitment and positioning and eccentric body weight control with ambulation re-education including up and down stairs     Home Exercise Program:    [x] (24486) Reviewed/Progressed HEP activities related to strengthening, flexibility, endurance, ROM of core, proximal hip and LE for functional self-care, mobility, lifting and ambulation/stair navigation   [] (16693)Reviewed/Progressed HEP activities related to improving balance, coordination, kinesthetic sense, posture, motor skill, proprioception of core, proximal hip and LE for self care, mobility, lifting, and ambulation/stair navigation      Manual Treatments:  PROM / STM / Oscillations-Mobs:  G-I, II, III, IV (PA's, Inf., Post.)  [x] (94222) Provided manual therapy to mobilize LE, proximal hip and/or LS spine soft tissue/joints for the purpose of modulating pain, promoting relaxation,  increasing ROM, reducing/eliminating soft tissue swelling/inflammation/restriction, improving soft tissue extensibility and allowing for proper ROM for normal function with self care, mobility, lifting and ambulation. Modalities:   Deferred. Instructed to Ice more at home.   [] GAME READY (VASO)- for significant edema, swelling, pain control. Charges:  Timed Code Treatment Minutes: 30'   Total Treatment Minutes: 61'     [facility] time in/time out:   (and requires time in and out for each CPT code)    [x] EVAL (LOW) 61371   [] EVAL (MOD) 45989  [] EVAL (HIGH) 06293   [] RE-EVAL     [x] RB(48047) x 1    [] IONTO  [x] NMR (03532) x 1    [] VASO  [] Manual (11432) x      [] Other:  [] TA x      [] Mech Traction (08766)  [] ES(attended) (55935)      [] ES (un) (77816):       GOALS:( If F/U appts are needed)  Patient stated goal: return to hiking  []? Progressing: []? Met: []? Not Met: []? Adjusted     Therapist goals for Patient:    Goals: To be achieved in: 8 weeks  1. Independent in HEP and progression per patient tolerance, in order to prevent re-injury. []? Progressing: []? Met: []? Not Met: []? Adjusted  2. Patient will have a decrease in pain to facilitate improvement in movement, function, and ADLs as indicated by Functional Deficits. []? Progressing: []? Met: []? Not Met: []? Adjusted  Progression Towards Functional goals:  [] Patient is progressing as expected towards functional goals listed. [] Progression is slowed due to complexities listed. [] Progression has been slowed due to co-morbidities. [x] Plan just implemented, too soon to assess goals progression  [] Other:         Overall Progression Towards Functional goals/ Treatment Progress Update:  [] Patient is progressing as expected towards functional goals listed. [] Progression is slowed due to complexities/Impairments listed. [] Progression has been slowed due to co-morbidities.   [x] Plan just implemented, too soon to assess goals progression <30days   [] Goals require adjustment due to lack of progress  [] Patient is not progressing as expected and requires additional follow up with physician  [] Other    Prognosis for POC: [x] Good [] Fair  [] Poor      Patient requires continued skilled intervention: [x] Yes  [] No    Treatment/Activity Tolerance:  [x] Patient able to complete treatment  [] Patient limited by fatigue  [] Patient limited by pain    [] Patient limited by other medical complications  [] Other:     ASSESSMENT: See eval    Return to Play: (if applicable)   []  Stage 1: Intro to Strength   []  Stage 2: Return to Run and Strength   []  Stage 3: Return to Jump and Strength   []  Stage 4: Dynamic Strength and Agility   []  Stage 5: Sport Specific Training     []  Ready to Return to Play, Meets All Above Stages   []  Not Ready for Return to Sports   Comments:                               PLAN: See eval. F/U 1-2 more visits if needed. [] Continue per plan of care [] Alter current plan (see comments above)  [x] Plan of care initiated [] Hold pending MD visit [] Discharge      Electronically signed by:  Roxana Higgins, [address] [place] [address] Road    Note: If patient does not return for scheduled/ recommended follow up visits, this note will serve as a discharge from care along with most recent update on progress.

## 2020-11-16 NOTE — PROGRESS NOTES
Subjective: Patient is here for follow-up of her 9/4/2020 right distal fibula fracture open reduction internal fixation. States she is doing well she finished physical therapy has been hiking and using hiking boots for these activities.   She is very active and states she has almost no pain whatsoever  Objective: Physical exam shows visions well-healed no evidence of infection sensations intact she has 20 degrees of dorsiflexion and 60 degrees of plantarflexion strength is 5/5  Imaging: 3 views of the right ankle show the fibula fracture well-healed  Assessment and plan: Patient is doing well she can follow-up with me as needed

## 2020-11-17 ENCOUNTER — OFFICE VISIT (OUTPATIENT)
Dept: ORTHOPEDIC SURGERY | Age: 50
End: 2020-11-17

## 2020-11-17 VITALS — HEIGHT: 65 IN | WEIGHT: 156.09 LBS | BODY MASS INDEX: 26.01 KG/M2

## 2020-11-17 PROCEDURE — 99024 POSTOP FOLLOW-UP VISIT: CPT | Performed by: ORTHOPAEDIC SURGERY

## 2021-02-16 PROBLEM — F41.0 PANIC ATTACKS: Status: ACTIVE | Noted: 2021-02-16

## 2021-02-17 ENCOUNTER — OFFICE VISIT (OUTPATIENT)
Dept: INTERNAL MEDICINE CLINIC | Age: 51
End: 2021-02-17
Payer: COMMERCIAL

## 2021-02-17 VITALS
SYSTOLIC BLOOD PRESSURE: 110 MMHG | WEIGHT: 151 LBS | DIASTOLIC BLOOD PRESSURE: 64 MMHG | TEMPERATURE: 97.8 F | HEART RATE: 68 BPM | BODY MASS INDEX: 25.13 KG/M2

## 2021-02-17 DIAGNOSIS — G35 MULTIPLE SCLEROSIS (HCC): ICD-10-CM

## 2021-02-17 DIAGNOSIS — Z11.59 SCREENING FOR VIRAL DISEASE: ICD-10-CM

## 2021-02-17 DIAGNOSIS — F41.0 PANIC ATTACKS: ICD-10-CM

## 2021-02-17 DIAGNOSIS — Z11.59 ENCOUNTER FOR HEPATITIS C SCREENING TEST FOR LOW RISK PATIENT: ICD-10-CM

## 2021-02-17 DIAGNOSIS — D21.9 FIBROIDS: ICD-10-CM

## 2021-02-17 DIAGNOSIS — G47.00 INSOMNIA, UNSPECIFIED TYPE: Primary | ICD-10-CM

## 2021-02-17 DIAGNOSIS — E78.00 HYPERCHOLESTEROLEMIA: ICD-10-CM

## 2021-02-17 DIAGNOSIS — R53.83 FATIGUE, UNSPECIFIED TYPE: ICD-10-CM

## 2021-02-17 PROCEDURE — 90686 IIV4 VACC NO PRSV 0.5 ML IM: CPT | Performed by: INTERNAL MEDICINE

## 2021-02-17 PROCEDURE — 99214 OFFICE O/P EST MOD 30 MIN: CPT | Performed by: INTERNAL MEDICINE

## 2021-02-17 PROCEDURE — 90471 IMMUNIZATION ADMIN: CPT | Performed by: INTERNAL MEDICINE

## 2021-02-17 RX ORDER — ALPRAZOLAM 0.25 MG/1
0.25 TABLET ORAL NIGHTLY PRN
Qty: 15 TABLET | Refills: 0 | Status: SHIPPED | OUTPATIENT
Start: 2021-02-17 | End: 2021-03-19

## 2021-02-17 RX ORDER — TRAZODONE HYDROCHLORIDE 50 MG/1
25-50 TABLET ORAL NIGHTLY PRN
Qty: 30 TABLET | Refills: 2 | Status: SHIPPED | OUTPATIENT
Start: 2021-02-17

## 2021-02-17 ASSESSMENT — PATIENT HEALTH QUESTIONNAIRE - PHQ9
SUM OF ALL RESPONSES TO PHQ QUESTIONS 1-9: 0
2. FEELING DOWN, DEPRESSED OR HOPELESS: 0
SUM OF ALL RESPONSES TO PHQ QUESTIONS 1-9: 0

## 2021-02-17 NOTE — PROGRESS NOTES
2021  Shantal Vasquez (:  1970)       ASSESSMENT/PLAN:   1. Insomnia, unspecified type  Assessment & Plan:  Intermittent issue. Patient feels is a seasonal issue, may also have some hormonal component and also situational in light of recent divorce. Using some apps to help with cognitive behavioral therapy. Discussed other options. We will do a trial of trazodone 50 mg, half nightly as needed  2. Panic attacks  Assessment & Plan:  Infrequent, keeps Xanax on hand as needed. Uses 20 or less on an annual basis. Orders:  -     ALPRAZolam (XANAX) 0.25 MG tablet; Take 1 tablet by mouth nightly as needed for Anxiety for up to 30 days. , Disp-15 tablet, R-0Normal  3. Fibroids  4. Hypercholesterolemia  Assessment & Plan:  Asymptomatic. On ketogenic diet. Recheck lipids today. Orders:  -     Lipid Panel; Future  5. Encounter for hepatitis C screening test for low risk patient  -     Hepatitis C Antibody; Future  6. Screening for viral disease  -     Covid-19, Antibody, Total; Future  7. Multiple sclerosis ([facility] [address]  Assessment & Plan:  Remains in remission. MRI 2020 was stable. 8. Fatigue, unspecified type  -     Comprehensive Metabolic Panel; Future  -     TSH with Reflex; Future  -     Vitamin D 25 Hydroxy; Future  -     CBC; Future      No follow-ups on file. SUBJECTIVE/OBJECTIVE:  52 y.o. female established patient here for:   Chief Complaint   Patient presents with    Follow-up     discuss medication options, having brain fog, not sleeping    Health Maintenance     Due for flu vaccine and pap     Amicable divorce in November. Feels like she is doing well, but recently struggling with sleep again. As she recalls, happens every winter. Trouble with falling asleep and staying asleep. Typically waked up around 3-4:30. Mind starts going at night when tries to go to sleep. Uses calm john and insight timer. Sometimes great but others not effective at all.    Avoids screens at night  Feels that she does well with exercise. Hikes a lot, eats well. Wonders if had covid. Feels like she gets have brain fog off and on, through the fall, which was also when divorce was happening. Trouble concentrating at times, typically happens 1-2x/week for last month or so. Starting to have more variability in menstrual cycles. Starting to have some night sweats. Tried progestin last year and had severe  Negative effect on mood. Still likes to have xanax in the event of having a panic attack. Review of Systems  Outpatient Medications Marked as Taking for the 2/17/21 encounter (Office Visit) with Joi Nieves MD   Medication Sig Dispense Refill    traZODone (DESYREL) 50 MG tablet Take 0.5-1 tablets by mouth nightly as needed for Sleep 30 tablet 2    ALPRAZolam (XANAX) 0.25 MG tablet Take 1 tablet by mouth nightly as needed for Anxiety for up to 30 days. 15 tablet 0    Cyanocobalamin (B-12 PO) Take by mouth      Cholecalciferol (VITAMIN D3) 5000 units TABS Take by mouth      MULTIPLE VITAMIN PO Take by mouth      BIOTIN PO Take by mouth       Physical Exam  Constitutional:       Appearance: Normal appearance. Cardiovascular:      Rate and Rhythm: Normal rate and regular rhythm. Pulmonary:      Effort: Pulmonary effort is normal.      Breath sounds: Normal breath sounds. Musculoskeletal:      Right lower leg: No edema. Left lower leg: No edema. Neurological:      General: No focal deficit present. Psychiatric:         Mood and Affect: Mood normal.     PHQ9=3  GAD7=2    This note was generated completely or in part utilizing Dragon dictation speech recognition software. Occasionally, words are mistranscribed and despite editing, the text may contain inaccuracies due to incorrect word recognition. If further clarification is needed please contact the office at [phone]  An electronic signature was used to authenticate this note.     --Joi Nieves MD

## 2021-02-17 NOTE — ASSESSMENT & PLAN NOTE
Intermittent issue. Patient feels is a seasonal issue, may also have some hormonal component and also situational in light of recent divorce. Using some apps to help with cognitive behavioral therapy. Discussed other options.   We will do a trial of trazodone 50 mg, half nightly as needed

## 2021-11-06 LAB
ALBUMIN SERPL-MCNC: NORMAL G/DL
ALP BLD-CCNC: NORMAL U/L
ALT SERPL-CCNC: NORMAL U/L
ANION GAP SERPL CALCULATED.3IONS-SCNC: NORMAL MMOL/L
AST SERPL-CCNC: NORMAL U/L
BASOPHILS ABSOLUTE: ABNORMAL
BASOPHILS RELATIVE PERCENT: ABNORMAL
BILIRUB SERPL-MCNC: NORMAL MG/DL
BUN BLDV-MCNC: 15 MG/DL
CALCIUM SERPL-MCNC: NORMAL MG/DL
CHLORIDE BLD-SCNC: NORMAL MMOL/L
CO2: NORMAL
CREAT SERPL-MCNC: 0.86 MG/DL
EOSINOPHILS ABSOLUTE: ABNORMAL
EOSINOPHILS RELATIVE PERCENT: ABNORMAL
GFR CALCULATED: NORMAL
GLUCOSE BLD-MCNC: 86 MG/DL
HCT VFR BLD CALC: 35.4 % (ref 36–46)
HEMOGLOBIN: 11.2 G/DL (ref 12–16)
LYMPHOCYTES ABSOLUTE: ABNORMAL
LYMPHOCYTES RELATIVE PERCENT: ABNORMAL
MCH RBC QN AUTO: ABNORMAL PG
MCHC RBC AUTO-ENTMCNC: ABNORMAL G/DL
MCV RBC AUTO: 91 FL
MONOCYTES ABSOLUTE: ABNORMAL
MONOCYTES RELATIVE PERCENT: ABNORMAL
NEUTROPHILS ABSOLUTE: ABNORMAL
NEUTROPHILS RELATIVE PERCENT: ABNORMAL
PLATELET # BLD: 306 K/ΜL
PMV BLD AUTO: ABNORMAL FL
POTASSIUM SERPL-SCNC: NORMAL MMOL/L
RBC # BLD: 12.9 10^6/ΜL
SODIUM BLD-SCNC: NORMAL MMOL/L
TOTAL PROTEIN: NORMAL
TSH SERPL DL<=0.05 MIU/L-ACNC: 2.05 UIU/ML
VITAMIN D 25-HYDROXY: 49.8
VITAMIN D2, 25 HYDROXY: NORMAL
VITAMIN D3,25 HYDROXY: NORMAL
WBC # BLD: 3.9 10^3/ML

## 2022-02-01 ENCOUNTER — OFFICE VISIT (OUTPATIENT)
Dept: INTERNAL MEDICINE CLINIC | Age: 52
End: 2022-02-01
Payer: COMMERCIAL

## 2022-02-01 VITALS
HEIGHT: 65 IN | SYSTOLIC BLOOD PRESSURE: 100 MMHG | HEART RATE: 68 BPM | BODY MASS INDEX: 26.33 KG/M2 | RESPIRATION RATE: 14 BRPM | OXYGEN SATURATION: 98 % | DIASTOLIC BLOOD PRESSURE: 64 MMHG | WEIGHT: 158 LBS

## 2022-02-01 DIAGNOSIS — D21.9 FIBROIDS: Primary | ICD-10-CM

## 2022-02-01 DIAGNOSIS — N92.0 MENORRHAGIA WITH REGULAR CYCLE: ICD-10-CM

## 2022-02-01 DIAGNOSIS — Z01.818 PRE-OP EVALUATION: ICD-10-CM

## 2022-02-01 PROCEDURE — 90471 IMMUNIZATION ADMIN: CPT | Performed by: INTERNAL MEDICINE

## 2022-02-01 PROCEDURE — 99214 OFFICE O/P EST MOD 30 MIN: CPT | Performed by: INTERNAL MEDICINE

## 2022-02-01 PROCEDURE — 90674 CCIIV4 VAC NO PRSV 0.5 ML IM: CPT | Performed by: INTERNAL MEDICINE

## 2022-02-01 RX ORDER — PROGESTERONE 100 MG/1
CAPSULE ORAL
COMMUNITY
Start: 2021-12-14

## 2022-02-01 RX ORDER — TESTOSTERONE 12.5 MG/1.25G
2.5 GEL TOPICAL
COMMUNITY
End: 2022-02-01

## 2022-02-01 SDOH — ECONOMIC STABILITY: FOOD INSECURITY: WITHIN THE PAST 12 MONTHS, YOU WORRIED THAT YOUR FOOD WOULD RUN OUT BEFORE YOU GOT MONEY TO BUY MORE.: NEVER TRUE

## 2022-02-01 SDOH — ECONOMIC STABILITY: FOOD INSECURITY: WITHIN THE PAST 12 MONTHS, THE FOOD YOU BOUGHT JUST DIDN'T LAST AND YOU DIDN'T HAVE MONEY TO GET MORE.: NEVER TRUE

## 2022-02-01 ASSESSMENT — SOCIAL DETERMINANTS OF HEALTH (SDOH): HOW HARD IS IT FOR YOU TO PAY FOR THE VERY BASICS LIKE FOOD, HOUSING, MEDICAL CARE, AND HEATING?: NOT HARD AT ALL

## 2022-02-01 ASSESSMENT — ENCOUNTER SYMPTOMS
SHORTNESS OF BREATH: 0
COUGH: 0

## 2022-02-01 NOTE — PROGRESS NOTES
[facility]- Internal Medicine  Preoperative Consultation      2022    Maureen Matthews  :  1970    Chief Complaint   Patient presents with    Pre-op Exam     2/10/22 hysterectomy @ [address] with Dr. Kong Napoles, colonoscopy due        HPI:  This patient presents to the office today for a preoperative consultation at the request of Dr. Radha Stewart to assess stability of patient's medical condition(s) listed below. She will be having Robot TLH/BS. Fibroids and menorrhagia, getting worse. Will be moving forward with hysterectomy. Also, seeing Kokomo Dilling due to development of adult acne and taking a number of supplements now which are starting to help. Review of Systems   Constitutional: Negative for chills and fever. Respiratory: Negative for cough and shortness of breath. Cardiovascular: Negative for chest pain and palpitations. Skin: Negative for rash. Hematological: Does not bruise/bleed easily.    Known anesthesia problems: None   Bleeding risk: No recent or remote history of abnormal bleeding (except for heavy menses)  Personal or FH of DVT/PE: No    Recent steroid use: no  Exercise tolerance: > 4 METS    Past Medical History:   Diagnosis Date    Allergic     Hypercholesterolemia 2017    Multiple sclerosis ([facility] [address]     Dr. Barry Baumgarten    Neuromuscular disorder ([facility] [address]     Panic attacks 2021    Pituitary adenoma ([facility] [address] 2020     Past Surgical History:   Procedure Laterality Date    ANKLE FRACTURE SURGERY Right 2020    OPEN REDUCTION INTERNAL FIXATION RIGHT FIBULA FRACTURE WITH MINI C-ARM AND BLOCK FOR PAIN CONTROL                     ARTHREX PLATES performed by Dominic Fagan MD at [address] [facility] LUMPECTOMY  2002     Family History   Problem Relation Age of Onset    Birth Defects Mother         Heart valve    High Blood Pressure Mother     Dementia Mother         age 76    Mult Sclerosis Sister     Diabetes Father         Type II    Stroke Maternal Grandfather     Dementia Maternal Grandmother      Social History     Tobacco Use    Smoking status: Former Smoker     Types: Cigarettes     Quit date: 10/3/2002     Years since quittin.3    Smokeless tobacco: Never Used    Tobacco comment: passive smoke exposure   Substance Use Topics    Alcohol use: Yes     Types: 2 Glasses of wine, 2 Cans of beer, 1 Shots of liquor per week    Drug use: No     Allergies   Allergen Reactions    Iv Contrast [Iodides] Hives    Shrimp (Diagnostic)        Outpatient Medications Marked as Taking for the 22 encounter (Office Visit) with Naun Richardson MD   Medication Sig Dispense Refill    progesterone (PROMETRIUM) 100 MG CAPS capsule       Emollient (DMAE) CREA Apply topically      UNABLE TO FIND 1 capsule DiMPRO 75 mg      UNABLE TO FIND Testosterone/estriol 2.5/5 vag suppository      UNABLE TO FIND Argentyn      UNABLE TO FIND Ferritin 60      UNABLE TO FIND Liquid iodine plus      Pregnenolone Micronized (PREGNENOLONE PO) Take by mouth      Cyanocobalamin (B-12 PO) Take by mouth      Cholecalciferol (VITAMIN D3) 5000 units TABS Take by mouth      MULTIPLE VITAMIN PO Take by mouth         Physical Exam:  /64   Pulse 68   Resp 14   Ht 5' 5\" (1.651 m)   Wt 158 lb (71.7 kg)   LMP 2022   SpO2 98%   BMI 26.29 kg/m²   Physical Exam  Constitutional:       General: She is not in acute distress. Eyes:      Pupils: Pupils are equal, round, and reactive to light. Neck:      Vascular: No carotid bruit. Cardiovascular:      Rate and Rhythm: Normal rate and regular rhythm. Heart sounds: No murmur heard. Pulmonary:      Effort: Pulmonary effort is normal.      Breath sounds: Normal breath sounds. Abdominal:      General: Bowel sounds are normal.      Palpations: Abdomen is soft. Tenderness: There is no abdominal tenderness. Musculoskeletal:      Right lower leg: No edema. Left lower leg: No edema.    Skin: Findings: No rash. Neurological:      General: No focal deficit present. Mental Status: She is alert and oriented to person, place, and time. EKG Interpretation:  not indicated. Lab Review:   No visits with results within 2 Month(s) from this visit. Latest known visit with results is:   Orders Only on 02/17/2021   Component Date Value    WBC 02/17/2021 5.9     RBC 02/17/2021 4.38     Hemoglobin 02/17/2021 13.3     Hematocrit 02/17/2021 39.7     MCV 02/17/2021 90.8     MCH 02/17/2021 30.4     MCHC 02/17/2021 33.5     RDW 02/17/2021 14.6     Platelets 70/31/8043 327     MPV 02/17/2021 8.6     Vit D, 25-Hydroxy 02/17/2021 49.7     TSH 02/17/2021 1.91     Sodium 02/17/2021 139     Potassium 02/17/2021 4.6     Chloride 02/17/2021 100     CO2 02/17/2021 25     Anion Gap 02/17/2021 14     Glucose 02/17/2021 91     BUN 02/17/2021 15     CREATININE 02/17/2021 0.7     GFR Non- 02/17/2021 >60     GFR  02/17/2021 >60     Calcium 02/17/2021 10.1     Total Protein 02/17/2021 7.2     Albumin 02/17/2021 4.9     Albumin/Globulin Ratio 02/17/2021 2.1     Total Bilirubin 02/17/2021 <0.2     Alkaline Phosphatase 02/17/2021 43     ALT 02/17/2021 11     AST 02/17/2021 15     Globulin 02/17/2021 2.3     Hep C Ab Interp 02/17/2021 Non-reactive     Cholesterol, Total 02/17/2021 218*    Triglycerides 02/17/2021 77     HDL 02/17/2021 102*    LDL Calculated 02/17/2021 101*    VLDL Cholesterol Calcula* 02/17/2021 15     SARS-CoV-2 Antibody, Tot* 02/17/2021 Negative         ASSESSMENT/PLAN:   Fibroids  Menorrhagia with regular cycle  Pre-op evaluation  Comments:  Medically stable with no contraindications to planned procedure. Pt advised to avoid NSAID's, OTC vitamins,  supplements and fish oil 1 week prior to procedure.       Wenceslao Hoff MD

## 2022-02-01 NOTE — PATIENT INSTRUCTIONS
Hold supplements 1 week before surgery. Avoid aspirin, ibuprofen, motrin, aleve, or naprosyn for 1 week prior to surgery or longer if instructed by surgeon.

## 2022-11-14 ENCOUNTER — OFFICE VISIT (OUTPATIENT)
Dept: INTERNAL MEDICINE CLINIC | Age: 52
End: 2022-11-14
Payer: COMMERCIAL

## 2022-11-14 VITALS
WEIGHT: 160 LBS | DIASTOLIC BLOOD PRESSURE: 70 MMHG | SYSTOLIC BLOOD PRESSURE: 114 MMHG | BODY MASS INDEX: 26.63 KG/M2 | TEMPERATURE: 97.2 F | HEART RATE: 64 BPM | OXYGEN SATURATION: 99 %

## 2022-11-14 DIAGNOSIS — L70.0 CYSTIC ACNE: ICD-10-CM

## 2022-11-14 DIAGNOSIS — R21 MALAR RASH: Primary | ICD-10-CM

## 2022-11-14 PROCEDURE — 90471 IMMUNIZATION ADMIN: CPT | Performed by: INTERNAL MEDICINE

## 2022-11-14 PROCEDURE — 90674 CCIIV4 VAC NO PRSV 0.5 ML IM: CPT | Performed by: INTERNAL MEDICINE

## 2022-11-14 PROCEDURE — 99213 OFFICE O/P EST LOW 20 MIN: CPT | Performed by: INTERNAL MEDICINE

## 2022-11-14 RX ORDER — ESTRADIOL AND PROGESTERONE 1; 100 MG/1; MG/1
1 CAPSULE ORAL DAILY
COMMUNITY
Start: 2022-08-17

## 2022-11-14 RX ORDER — DOXYCYCLINE HYCLATE 100 MG
100 TABLET ORAL 2 TIMES DAILY
Qty: 28 TABLET | Refills: 0 | Status: SHIPPED | OUTPATIENT
Start: 2022-11-14 | End: 2022-11-28

## 2022-11-14 ASSESSMENT — PATIENT HEALTH QUESTIONNAIRE - PHQ9
2. FEELING DOWN, DEPRESSED OR HOPELESS: 0
SUM OF ALL RESPONSES TO PHQ9 QUESTIONS 1 & 2: 0
SUM OF ALL RESPONSES TO PHQ QUESTIONS 1-9: 0
SUM OF ALL RESPONSES TO PHQ QUESTIONS 1-9: 0
1. LITTLE INTEREST OR PLEASURE IN DOING THINGS: 0
SUM OF ALL RESPONSES TO PHQ QUESTIONS 1-9: 0
SUM OF ALL RESPONSES TO PHQ QUESTIONS 1-9: 0

## 2022-11-14 NOTE — PROGRESS NOTES
Shantal Pedro   :  1970  2022    ASSESSMENT/PLAN:   1. Malar rash  Acne versus rosacea. Initially started periorally last year. Recent lesions on the malar area. Doxycycline 100 mg twice daily x2 weeks. Discussed risks and benefits of the medication, including most common side effects. Start MetroCream.  Need to consider spironolactone  Labs to rule out lupus. Referring to Dr. Kiya Shaw, dermatology. -     aMrion Fu MD, Dermatology, [facility]  -     CBC with Auto Differential; Future  -     SAMREEN; Future  -     Anti-DNA Antibody, Double-Stranded; Future      50 y.o. female established patient here for:   Chief Complaint   Patient presents with    Acne     Swelling in face , Redness, Rosacea     Acne in past year. Had sees somein in dermatology group, really focused on hormones and topical clindamycin. Seemed to get better until last month, after had covid. Repeated central pustules/cystic lesions. Tried salicylic acid, didn't help, so then tried differen gel but only twice, last month. Also used clindamycin again but last a few weeks ago. Something new almost every day. Gets white head that then get deep. S/p hysterectomy but has ovaries. Review of Systems    Outpatient Medications Marked as Taking for the 22 encounter (Office Visit) with Emiliano Beverly MD   Medication Sig Dispense Refill    Estradiol-Progesterone (BIJUVA) 1-100 MG CAPS Take 1 tablet by mouth daily      doxycycline hyclate (VIBRA-TABS) 100 MG tablet Take 1 tablet by mouth 2 times daily for 14 days 28 tablet 0    metroNIDAZOLE (METROCREAM) 0.75 % cream Apply topically 2 times daily.  60 g 2    Emollient (DMAE) CREA Apply topically      UNABLE TO FIND 1 capsule DiMPRO 75 mg      UNABLE TO FIND Argentyn      UNABLE TO FIND Ferritin 60      UNABLE TO FIND Liquid iodine plus      Cyanocobalamin (B-12 PO) Take by mouth      Cholecalciferol (VITAMIN D3) 5000 units TABS Take by mouth      MULTIPLE VITAMIN PO Take by mouth         OBJECTIVE:  Vitals:    11/14/22 1112   BP: 114/70   Pulse: 64   Temp: 97.2 °F (36.2 °C)   TempSrc: Temporal   SpO2: 99%   Weight: 160 lb (72.6 kg)     Physical Exam  Skin:     Findings: Rash (Central face with erythematous tender papules. No involvement lower face chest or back. See photo) present. This note was generated completely or in part utilizing Dragon dictation speech recognition software. Occasionally, words are mistranscribed and despite editing, the text may contain inaccuracies due to incorrect word recognition.   If further clarification is needed please contact the office at [phone]  --Ricardo Lugo MD

## 2022-11-15 ENCOUNTER — TELEPHONE (OUTPATIENT)
Dept: DERMATOLOGY | Age: 52
End: 2022-11-15

## 2022-11-15 NOTE — TELEPHONE ENCOUNTER
Called and LM for pt to call the office. Please add to 11/16/22 931o per Dr. Laurel Gustafson.  New Patient

## 2022-11-16 ENCOUNTER — OFFICE VISIT (OUTPATIENT)
Dept: DERMATOLOGY | Age: 52
End: 2022-11-16
Payer: COMMERCIAL

## 2022-11-16 DIAGNOSIS — L71.9 ROSACEA: Primary | ICD-10-CM

## 2022-11-16 PROCEDURE — 99204 OFFICE O/P NEW MOD 45 MIN: CPT | Performed by: DERMATOLOGY

## 2022-11-16 RX ORDER — DOXYCYCLINE HYCLATE 100 MG/1
CAPSULE ORAL
Qty: 60 CAPSULE | Refills: 3 | Status: SHIPPED | OUTPATIENT
Start: 2022-11-16

## 2022-11-16 RX ORDER — SPIRONOLACTONE 25 MG/1
TABLET ORAL
Qty: 180 TABLET | Refills: 3 | Status: SHIPPED | OUTPATIENT
Start: 2022-11-16

## 2022-11-16 NOTE — PROGRESS NOTES
[facility]) Dermatology  Hernandez Morgan M.D.  [phone]       Maryjane Wiley  1970    50 y.o. female     Date of Visit: 2022    Chief Complaint:   Chief Complaint   Patient presents with    Skin Lesion     face        I was asked to see this patient by Dr. Boy Landeros. History of Present Illness:  1. New patient    Patient presents today for evaluation of a new facial rash. Has had inflammatory papules and active cysts over the last year and has a longer history of rosacea. Has previously been treated with topical MetroCream 0.75%. This has been helpful in the past.  However, at the beginning of October had COVID. Developed worsening erythema with multiple active inflammatory papules and cysts nose and paranasal cheeks. Painful. Saw her primary care doctor 2 days ago who started her on doxycycline 100 mg p.o. twice daily, had her continue her MetroCream 0.75% and discussed spironolactone. She also checked an SAMREEN which was positive although a titer was not given. Status post hysterectomy    Review of Systems:  Constitutional: Reports general sense of well-being       Past Medical History, Surgical History, Family History, Medications and Allergies reviewed.     Social History:   Social History     Socioeconomic History    Marital status:      Spouse name: Not on file    Number of children: Not on file    Years of education: Not on file    Highest education level: Not on file   Occupational History    Not on file   Tobacco Use    Smoking status: Former     Packs/day: 1.00     Years: 20.00     Pack years: 20.00     Types: Cigarettes     Start date:      Quit date: 10/3/2002     Years since quittin.1    Smokeless tobacco: Never    Tobacco comments:     passive smoke exposure   Substance and Sexual Activity    Alcohol use: Yes     Types: 2 Glasses of wine, 2 Cans of beer, 1 Shots of liquor per week    Drug use: No    Sexual activity: Yes     Partners: Male     Comment: , single partner for 10 years   Other Topics Concern    Not on file   Social History Narrative    Not on file     Social Determinants of Health     Financial Resource Strain: Low Risk     Difficulty of Paying Living Expenses: Not hard at all   Food Insecurity: No Food Insecurity    Worried About Running Out of Food in the Last Year: Never true    Ran Out of Food in the Last Year: Never true   Transportation Needs: Not on file   Physical Activity: Not on file   Stress: Not on file   Social Connections: Not on file   Intimate Partner Violence: Not on file   Housing Stability: Not on file       Physical Examination       -General: Well-appearing, NAD  1. Erythematous plaque nose, cheeks with multiple active inflammatory papules, pustules, some resolving cysts      Assessment and Plan     1. Rosacea -suspect low likelihood of lupus given multiple active inflammatory papules and cysts-however, complete evaluation of SAMREEN with titer. Continue doxycycline 100 mg p.o. twice daily but continue for 3 months. Start spironolactone gradually increasing to 75 mg p.o. twice daily. Continue MetroCream 0.75%, gentle soaps. Reviewed side effects and contraindications to doxycycline-GI upset, photosensitivity, pseudotumor. Also discussed side effects to spironolactone including hypotension, elevated potassium, hormonal side effects. Potassium check in 1 month.   I will plan to see her back in 2 to 3 months or sooner if needed and she will complete her lupus evaluation through her primary care doctor

## 2022-12-02 ENCOUNTER — PATIENT MESSAGE (OUTPATIENT)
Dept: INTERNAL MEDICINE CLINIC | Age: 52
End: 2022-12-02

## 2022-12-02 RX ORDER — FLUCONAZOLE 150 MG/1
150 TABLET ORAL ONCE
Qty: 2 TABLET | Refills: 0 | Status: SHIPPED | OUTPATIENT
Start: 2022-12-02 | End: 2022-12-02

## 2023-01-04 DIAGNOSIS — L71.9 ROSACEA: ICD-10-CM

## 2023-01-04 LAB — POTASSIUM SERPL-SCNC: 4.4 MMOL/L (ref 3.5–5.1)

## 2023-01-09 ENCOUNTER — OFFICE VISIT (OUTPATIENT)
Dept: DERMATOLOGY | Age: 53
End: 2023-01-09
Payer: COMMERCIAL

## 2023-01-09 DIAGNOSIS — L71.9 ROSACEA: Primary | ICD-10-CM

## 2023-01-09 DIAGNOSIS — D48.5 NEOPLASM OF UNCERTAIN BEHAVIOR OF SKIN: ICD-10-CM

## 2023-01-09 PROCEDURE — 99214 OFFICE O/P EST MOD 30 MIN: CPT | Performed by: DERMATOLOGY

## 2023-01-09 NOTE — PROGRESS NOTES
[address]  Dermatology  Negro Best M.D.  [phone]       Natalie Hull  1970    50 y.o. female     Date of Visit: 1/9/2023    Chief Complaint:   Chief Complaint   Patient presents with    Skin Lesion        I was asked to see this patient by Dr. Tsang ref. provider found. History of Present Illness:  1. Patient presents today for follow-up of rosacea. Flared this fall after COVID although had had some milder rosacea previously. Was on doxycycline 100 mg p.o. Twice daily, developed vaginal yeast infections that cleared with Monistat-dose of doxycycline was reduced down to 100 mg p.o. daily. She has continued MetroCream 0.75% twice daily. She is also on spironolactone 75 mg p.o. twice daily and has tolerated this well. [place] [facility] [place]-will be camping. Did complete her lupus work-up through her primary care doctor which showed a positive SAMREEN at 1-80, but further evaluation was negative. Rosacea much improved-no active inflammatory papules or cysts. Does have background erythema and multiple telangiectasias nose and cheeks. New hypopigmented papule right upper cutaneous lip. Present for the last couple of years. Has increased in size. Has never become crusted, pruritic, blood  Sister with a history of basal cell carcinoma. Parents may also have a history of nonmelanoma skin cancer  Very careful about hats and sunscreen-wears a rash guard/UPF clothing, does spend time outside        Status post hysterectomy      Review of Systems:  Constitutional: Reports general sense of well-being       Past Medical History, Surgical History, Family History, Medications and Allergies reviewed.     Social History:   Social History     Socioeconomic History    Marital status:      Spouse name: Not on file    Number of children: Not on file    Years of education: Not on file    Highest education level: Not on file   Occupational History    Not on file   Tobacco Use    Smoking status: Former     Packs/day: 1.00     Years: 20.00     Pack years: 20.00     Types: Cigarettes     Start date:      Quit date: 10/3/2002     Years since quittin.2    Smokeless tobacco: Never    Tobacco comments:     passive smoke exposure   Substance and Sexual Activity    Alcohol use: Yes     Types: 2 Glasses of wine, 2 Cans of beer, 1 Shots of liquor per week    Drug use: No    Sexual activity: Yes     Partners: Male     Comment: , single partner for 10 years   Other Topics Concern    Not on file   Social History Narrative    Not on file     Social Determinants of Health     Financial Resource Strain: Low Risk     Difficulty of Paying Living Expenses: Not hard at all   Food Insecurity: No Food Insecurity    Worried About Running Out of Food in the Last Year: Never true    Ran Out of Food in the Last Year: Never true   Transportation Needs: Not on file   Physical Activity: Not on file   Stress: Not on file   Social Connections: Not on file   Intimate Partner Violence: Not on file   Housing Stability: Not on file       Physical Examination       -General: Well-appearing, NAD  1. Well-developed well-nourished. Background erythema nose, cheeks. No active inflammatory papules or cysts  4 mm hypopigmented papule right upper cutaneous lip    Potassium check 2023 within normal limits      Assessment and Plan     1. Rosacea-Taper off of doxycycline-reduce down to 100 mg every other day for the next week, then discontinue, so that she can be off of doxycycline prior to leaving for [facility]. Continue spironolactone 75 mg p.o. twice daily and MetroCream 0.75% twice daily. Recheck 1 month-consider reducing spironolactone dose. Discussed background erythema and telangiectasias-Mirvaso, Rhofade, laser. Patient declines for now   2.  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

## 2023-02-06 ENCOUNTER — OFFICE VISIT (OUTPATIENT)
Dept: DERMATOLOGY | Age: 53
End: 2023-02-06
Payer: COMMERCIAL

## 2023-02-06 DIAGNOSIS — L71.9 ROSACEA: Primary | ICD-10-CM

## 2023-02-06 DIAGNOSIS — D48.5 NEOPLASM OF UNCERTAIN BEHAVIOR OF SKIN: ICD-10-CM

## 2023-02-06 PROCEDURE — 99214 OFFICE O/P EST MOD 30 MIN: CPT | Performed by: DERMATOLOGY

## 2023-02-06 PROCEDURE — 11102 TANGNTL BX SKIN SINGLE LES: CPT | Performed by: DERMATOLOGY

## 2023-02-06 NOTE — PROGRESS NOTES
[facility]) Dermatology  Randall Daley M.D.  [phone]       Helga Leo  1970    50 y.o. female     Date of Visit: 2023    Chief Complaint:   Chief Complaint   Patient presents with    Skin Lesion        I was asked to see this patient by Dr. Tsang ref. provider found. History of Present Illness:  1. Patient presents today for biopsy right upper cutaneous lip and follow-up of rosacea. Discontinued doxycycline since her last visit. Continued MetroCream 0.75% and spironolactone 75 mg p.o. twice daily. Had a few smaller inflammatory papules but no cysts. Not using her MetroCream twice daily but usually using it once daily. Tolerating this without difficulty. 4 mm hypopigmented papule noted right upper cutaneous lip 2023 at last visit. Patient had noticed this over the last couple of years, had increased in size. Sister with a history of basal cell carcinoma. Parents may also have a history of nonmelanoma skin cancer  Very careful about hats and sunscreen-wears a rash guard/UPF clothing, does spend time outside           Status post hysterectomy      Review of Systems:  Constitutional: Reports general sense of well-being       Past Medical History, Surgical History, Family History, Medications and Allergies reviewed.     Social History:   Social History     Socioeconomic History    Marital status:      Spouse name: Not on file    Number of children: Not on file    Years of education: Not on file    Highest education level: Not on file   Occupational History    Not on file   Tobacco Use    Smoking status: Former     Packs/day: 1.00     Years: 20.00     Pack years: 20.00     Types: Cigarettes     Start date:      Quit date: 10/3/2002     Years since quittin.3    Smokeless tobacco: Never    Tobacco comments:     passive smoke exposure   Substance and Sexual Activity    Alcohol use: Yes     Types: 2 Glasses of wine, 2 Cans of beer, 1 Shots of liquor per week    Drug use: No    Sexual activity: Yes     Partners: Male     Comment: , single partner for 10 years   Other Topics Concern    Not on file   Social History Narrative    Not on file     Social Determinants of Health     Financial Resource Strain: Not on file   Food Insecurity: Not on file   Transportation Needs: Not on file   Physical Activity: Not on file   Stress: Not on file   Social Connections: Not on file   Intimate Partner Violence: Not on file   Housing Stability: Not on file       Physical Examination       -General: Well-appearing, NAD  1. Well-developed well-nourished. Background erythema nose, cheeks. Less than 1+ active inflammatory papules    4 mm hypopigmented papule right upper cutaneous lip        Assessment and Plan     1. Rosacea-continue spironolactone 75 mg p.o. twice daily and MetroCream 0.75% nightly-increase to twice daily during flares. Remain off doxycycline. Recheck 3 to 4 months sooner if needed   2. 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 Patient agreeable to biopsy. Verbal consent obtained after risks (infection, bleeding, scar), benefits and alternatives explained. -Area(s) to be biopsied were marked with a surgical pen. Site(s) were cleansed with alcohol. Local anesthesia achieved with 1% lidocaine with epinephrine/sodium bicarbonate. Shave biopsy performed with a razor blade. Hemostasis was achieved with aluminum chloride. The wound(s) were dressed with petrolatum and covered with a bandage. Specimen(s) sent to pathology. Pt educated re: risk of bleeding, infection, scar and wound care instructions.

## 2023-02-08 LAB — DERMATOLOGY PATHOLOGY REPORT: ABNORMAL

## 2023-02-10 DIAGNOSIS — C44.01 BASAL CELL CARCINOMA (BCC) OF SKIN OF RIGHT UPPER LIP: Primary | ICD-10-CM

## 2023-03-08 LAB — MAMMOGRAPHY, EXTERNAL: NORMAL

## 2023-03-16 RX ORDER — SPIRONOLACTONE 25 MG/1
TABLET ORAL
Qty: 180 TABLET | Refills: 6 | Status: SHIPPED | OUTPATIENT
Start: 2023-03-16 | End: 2023-05-09 | Stop reason: SDUPTHER

## 2023-04-04 ENCOUNTER — TELEPHONE (OUTPATIENT)
Dept: SURGERY | Age: 53
End: 2023-04-04

## 2023-04-13 ENCOUNTER — APPOINTMENT (OUTPATIENT)
Dept: GENERAL RADIOLOGY | Age: 53
End: 2023-04-13
Payer: COMMERCIAL

## 2023-04-13 ENCOUNTER — HOSPITAL ENCOUNTER (EMERGENCY)
Age: 53
Discharge: HOME | End: 2023-04-14
Attending: STUDENT IN AN ORGANIZED HEALTH CARE EDUCATION/TRAINING PROGRAM
Payer: COMMERCIAL

## 2023-04-13 VITALS
HEIGHT: 66 IN | BODY MASS INDEX: 25.83 KG/M2 | RESPIRATION RATE: 14 BRPM | HEART RATE: 96 BPM | OXYGEN SATURATION: 98 % | WEIGHT: 160.7 LBS | TEMPERATURE: 98.3 F
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
## 2023-04-13 DIAGNOSIS — Z23 NEED FOR TETANUS BOOSTER: ICD-10-CM

## 2023-04-13 PROCEDURE — 73090 X-RAY EXAM OF FOREARM: CPT

## 2023-04-13 PROCEDURE — 90471 IMMUNIZATION ADMIN: CPT | Performed by: STUDENT IN AN ORGANIZED HEALTH CARE EDUCATION/TRAINING PROGRAM

## 2023-04-13 PROCEDURE — 73110 X-RAY EXAM OF WRIST: CPT

## 2023-04-13 PROCEDURE — 90715 TDAP VACCINE 7 YRS/> IM: CPT | Performed by: STUDENT IN AN ORGANIZED HEALTH CARE EDUCATION/TRAINING PROGRAM

## 2023-04-13 PROCEDURE — 99284 EMERGENCY DEPT VISIT MOD MDM: CPT

## 2023-04-13 PROCEDURE — 6370000000 HC RX 637 (ALT 250 FOR IP): Performed by: STUDENT IN AN ORGANIZED HEALTH CARE EDUCATION/TRAINING PROGRAM

## 2023-04-13 PROCEDURE — 6360000002 HC RX W HCPCS: Performed by: STUDENT IN AN ORGANIZED HEALTH CARE EDUCATION/TRAINING PROGRAM

## 2023-04-13 PROCEDURE — 96372 THER/PROPH/DIAG INJ SC/IM: CPT

## 2023-04-13 PROCEDURE — 2500000003 HC RX 250 WO HCPCS: Performed by: STUDENT IN AN ORGANIZED HEALTH CARE EDUCATION/TRAINING PROGRAM

## 2023-04-13 PROCEDURE — 12004 RPR S/N/AX/GEN/TRK7.6-12.5CM: CPT

## 2023-04-13 RX ORDER — IBUPROFEN 600 MG/1
600 TABLET ORAL ONCE
Status: COMPLETED | OUTPATIENT
Start: 2023-04-13 | End: 2023-04-13

## 2023-04-13 RX ORDER — LIDOCAINE HYDROCHLORIDE AND EPINEPHRINE 10; 10 MG/ML; UG/ML
20 INJECTION, SOLUTION INFILTRATION; PERINEURAL ONCE
Status: COMPLETED | OUTPATIENT
Start: 2023-04-13 | End: 2023-04-13

## 2023-04-13 RX ORDER — AMOXICILLIN AND CLAVULANATE POTASSIUM 875; 125 MG/1; MG/1
1 TABLET, FILM COATED ORAL 2 TIMES DAILY
Qty: 14 TABLET | Refills: 0 | Status: SHIPPED | OUTPATIENT
Start: 2023-04-13 | End: 2023-04-20

## 2023-04-13 RX ADMIN — IBUPROFEN 600 MG: 600 TABLET, FILM COATED ORAL at 21:19

## 2023-04-13 RX ADMIN — LIDOCAINE HYDROCHLORIDE,EPINEPHRINE BITARTRATE 20 ML: 10; .01 INJECTION, SOLUTION INFILTRATION; PERINEURAL at 21:20

## 2023-04-13 RX ADMIN — TETANUS TOXOID, REDUCED DIPHTHERIA TOXOID AND ACELLULAR PERTUSSIS VACCINE, ADSORBED 0.5 ML: 5; 2.5; 8; 8; 2.5 SUSPENSION INTRAMUSCULAR at 21:21

## 2023-04-13 ASSESSMENT — PAIN DESCRIPTION - FREQUENCY: FREQUENCY: CONTINUOUS

## 2023-04-13 ASSESSMENT — PAIN DESCRIPTION - ORIENTATION: ORIENTATION: LEFT;RIGHT

## 2023-04-13 ASSESSMENT — PAIN DESCRIPTION - PAIN TYPE: TYPE: ACUTE PAIN

## 2023-04-13 ASSESSMENT — PAIN DESCRIPTION - DESCRIPTORS: DESCRIPTORS: ACHING;DULL

## 2023-04-13 ASSESSMENT — PAIN SCALES - GENERAL
PAINLEVEL_OUTOF10: 6
PAINLEVEL_OUTOF10: 2

## 2023-04-13 ASSESSMENT — PAIN DESCRIPTION - LOCATION: LOCATION: ARM;WRIST

## 2023-04-13 ASSESSMENT — PAIN - FUNCTIONAL ASSESSMENT: PAIN_FUNCTIONAL_ASSESSMENT: 0-10

## 2023-04-14 NOTE — ED PROVIDER NOTES
wound re-check, If symptoms worsen, return sooner if signs or symptoms of infection develop as we discussed      DISCHARGE MEDICATIONS:  Discharge Medication List as of 4/13/2023 11:58 PM        START taking these medications    Details   amoxicillin-clavulanate (AUGMENTIN) 875-125 MG per tablet Take 1 tablet by mouth 2 times daily for 7 days, Disp-14 tablet, R-0Normal             DISCONTINUED MEDICATIONS:  Discharge Medication List as of 4/13/2023 11:58 PM                 (Please note that portions of this note were completed with a voice recognition program.  Efforts were made to edit the dictations but occasionally words are mis-transcribed.)    Gen Jordan MD (electronically signed)            Gen Jordan MD  04/15/23 6103

## 2023-04-14 NOTE — ED NOTES
Cleaned dog bites on both arms with Hibiclens and saline. Pt tolerated well.       Vandana Abdullahi, P  04/13/23 2125

## 2023-04-14 NOTE — ED NOTES
Pt dc/d with instructions in stable condition, ambulatory to lobby. Home per ride.      Brian Kidd RN  04/14/23 1562

## 2023-05-09 ENCOUNTER — OFFICE VISIT (OUTPATIENT)
Dept: DERMATOLOGY | Age: 53
End: 2023-05-09
Payer: COMMERCIAL

## 2023-05-09 DIAGNOSIS — L71.9 ROSACEA: Primary | ICD-10-CM

## 2023-05-09 DIAGNOSIS — C44.01 BCC (BASAL CELL CARCINOMA), LIP: ICD-10-CM

## 2023-05-09 PROCEDURE — 99214 OFFICE O/P EST MOD 30 MIN: CPT | Performed by: DERMATOLOGY

## 2023-05-09 RX ORDER — SPIRONOLACTONE 25 MG/1
TABLET ORAL
Qty: 180 TABLET | Refills: 6 | Status: SHIPPED | OUTPATIENT
Start: 2023-05-09

## 2023-05-09 NOTE — PROGRESS NOTES
[facility]) Dermatology  Reba Zayas M.D.  [phone]       Iftikhar Sanabria  1970    50 y.o. female     Date of Visit: 2023    Chief Complaint:   Chief Complaint   Patient presents with    Skin Lesion        I was asked to see this patient by Dr. Tsang ref. provider found. History of Present Illness:  1. Patient presents today for ghxrnj-[id number] basal cell carcinoma biopsied right upper cutaneous lip 2023. She is trying to arrange her Mohs appointment around her work schedule but plans to have Mohs this summer. Patient also presents today for follow-up of rosacea. Initially was on doxycycline but has been off of this since 2023. Continues on spironolactone 75 mg p.o. twice daily and MetroCream.  Recently out of spironolactone and has not noticed a recurrence yet. Denies side effects to spironolactone. Sister with a history of basal cell carcinoma. Parents may also have a history of nonmelanoma skin cancer  Very careful about hats and sunscreen-wears a rash guard/UPF clothing, does spend time outside           Status post hysterectomy    Review of Systems:  Constitutional: Reports general sense of well-being       Past Medical History, Surgical History, Family History, Medications and Allergies reviewed.     Social History:   Social History     Socioeconomic History    Marital status:      Spouse name: Not on file    Number of children: Not on file    Years of education: Not on file    Highest education level: Not on file   Occupational History    Not on file   Tobacco Use    Smoking status: Former     Packs/day: 1.00     Years: 20.00     Pack years: 20.00     Types: Cigarettes     Start date:      Quit date: 10/3/2002     Years since quittin.6    Smokeless tobacco: Never    Tobacco comments:     passive smoke exposure   Substance and Sexual Activity    Alcohol use: Yes     Types: 2 Glasses of wine, 2 Cans of beer, 1 Shots of liquor per week    Drug use:

## 2023-05-09 NOTE — TELEPHONE ENCOUNTER
Pt walked in and asked to be rescheduled for Mohs appt and had requested the last week in June. See appt desk for specific details regarding Mohs appt.

## 2023-06-27 ENCOUNTER — PROCEDURE VISIT (OUTPATIENT)
Dept: SURGERY | Age: 53
End: 2023-06-27
Payer: COMMERCIAL

## 2023-06-27 VITALS — SYSTOLIC BLOOD PRESSURE: 110 MMHG | DIASTOLIC BLOOD PRESSURE: 85 MMHG | HEART RATE: 69 BPM

## 2023-06-27 DIAGNOSIS — C44.01 BASAL CELL CARCINOMA (BCC) OF SKIN OF RIGHT UPPER LIP: Primary | ICD-10-CM

## 2023-06-27 PROCEDURE — 12051 INTMD RPR FACE/MM 2.5 CM/<: CPT | Performed by: DERMATOLOGY

## 2023-06-27 PROCEDURE — 17311 MOHS 1 STAGE H/N/HF/G: CPT | Performed by: DERMATOLOGY

## 2023-06-28 ENCOUNTER — TELEPHONE (OUTPATIENT)
Dept: SURGERY | Age: 53
End: 2023-06-28

## 2023-07-03 ENCOUNTER — NURSE ONLY (OUTPATIENT)
Dept: SURGERY | Age: 53
End: 2023-07-03

## 2023-07-03 DIAGNOSIS — Z48.02 VISIT FOR SUTURE REMOVAL: Primary | ICD-10-CM

## 2023-07-03 NOTE — PROGRESS NOTES
S:  The patient is here for suture removal s/p Mohs surgery on the right upper cutaneous lip and Intermediate layered closure repair, 1 week ago. The site appears well-healed without signs of infection (redness, pain or discharge). The sutures were removed. Wound care and activity instructions given. The patient was scheduled for follow-up prn for scar/wound check. The patient was scheduled for f/u with General Dermatology per their instructions.

## 2023-10-02 ENCOUNTER — TELEPHONE (OUTPATIENT)
Dept: INTERNAL MEDICINE CLINIC | Age: 53
End: 2023-10-02

## 2023-10-02 DIAGNOSIS — Z13.220 LIPID SCREENING: ICD-10-CM

## 2023-10-02 DIAGNOSIS — Z13.1 SCREENING FOR DIABETES MELLITUS: Primary | ICD-10-CM

## 2023-10-02 NOTE — TELEPHONE ENCOUNTER
Let her know that I have test orders in the lab system. Also tell her that I have looked in chart, and we have not received any message from her since last December. Finally, let her know that I'd like her to get a physical scheduled at some point with me. It's been almost a year since she was in, and last appointment was just a quick visit for rash.

## 2023-10-02 NOTE — TELEPHONE ENCOUNTER
----- Message from Chalino Jeronimo sent at 10/2/2023  9:38 AM EDT -----  Subject: Message to Provider    QUESTIONS  Information for Provider? pt needs bio blood test by the end of the week   to keep her insurance  pt said she's put in a my chart msg about the   test with no response   ---------------------------------------------------------------------------  --------------  Aly CROCKER  [phone]; OK to leave message on voicemail  ---------------------------------------------------------------------------  --------------  SCRIPT ANSWERS  Relationship to Patient?  Self

## 2023-11-08 ENCOUNTER — OFFICE VISIT (OUTPATIENT)
Dept: INTERNAL MEDICINE CLINIC | Age: 53
End: 2023-11-08

## 2023-11-08 VITALS
TEMPERATURE: 97.7 F | BODY MASS INDEX: 26.03 KG/M2 | DIASTOLIC BLOOD PRESSURE: 74 MMHG | HEIGHT: 66 IN | SYSTOLIC BLOOD PRESSURE: 108 MMHG | WEIGHT: 162 LBS | OXYGEN SATURATION: 98 % | HEART RATE: 62 BPM

## 2023-11-08 DIAGNOSIS — Z00.00 WELL ADULT EXAM: Primary | ICD-10-CM

## 2023-11-08 DIAGNOSIS — Z12.11 COLON CANCER SCREENING: ICD-10-CM

## 2023-11-08 SDOH — ECONOMIC STABILITY: HOUSING INSECURITY
IN THE LAST 12 MONTHS, WAS THERE A TIME WHEN YOU DID NOT HAVE A STEADY PLACE TO SLEEP OR SLEPT IN A SHELTER (INCLUDING NOW)?: NO

## 2023-11-08 SDOH — ECONOMIC STABILITY: FOOD INSECURITY: WITHIN THE PAST 12 MONTHS, THE FOOD YOU BOUGHT JUST DIDN'T LAST AND YOU DIDN'T HAVE MONEY TO GET MORE.: NEVER TRUE

## 2023-11-08 SDOH — ECONOMIC STABILITY: FOOD INSECURITY: WITHIN THE PAST 12 MONTHS, YOU WORRIED THAT YOUR FOOD WOULD RUN OUT BEFORE YOU GOT MONEY TO BUY MORE.: NEVER TRUE

## 2023-11-08 SDOH — ECONOMIC STABILITY: INCOME INSECURITY: HOW HARD IS IT FOR YOU TO PAY FOR THE VERY BASICS LIKE FOOD, HOUSING, MEDICAL CARE, AND HEATING?: NOT HARD AT ALL

## 2023-11-08 NOTE — PROGRESS NOTES
ASSESSMENT/PLAN:   Wellness exam  Discussed age appropriate preventive care including healthy diet, daily exercise, immunizations and age & gender guided screening tests. Flu shot and Hep A # 2 (going to [place] in spring)  Advise shingrix and seasonal covid. Placed referral for colonoscopy. Return in about 1 year (around 2024) for CPE. Shantal Vasquez (:  1970) is a 48 y.o. female, here for annual preventive visit. Chief Complaint   Patient presents with    Annual Exam     No concerns voiced     Noticing weight gain without changing habits. Had hysterectomy, still on estrogen with progestin (Rufino Faust) per Dr. Kayy Santos. No hot flashes or sweats. Initially on progesterone for sleep and heavy bleeding? . S/p hysterectomy. CBN for sleep several night weekly. Exercise: walking, hiking mostly. Some hand weights. Diet: whole foods, heavy vegetables. Mediterranean. No LMP recorded.      Patient Care Team:  Julien Layton MD as PCP - General  Julien Layton MD as PCP - Empaneled Provider  Chente Scales MD (Neurology)    Health Maintenance   Topic Date Due    Hepatitis B vaccine (1 of 3 - 3-dose series) Never done    Colorectal Cancer Screen  Never done    Shingles vaccine (1 of 2) Never done    COVID-19 Vaccine (4 - -24 season) 2023    Depression Screen  10/02/2024    Breast cancer screen  2025    Lipids  2026    DTaP/Tdap/Td vaccine (3 - Td or Tdap) 2033    Flu vaccine  Completed    Hepatitis C screen  Completed    Hepatitis A vaccine  Aged Out    Hib vaccine  Aged Out    Meningococcal (ACWY) vaccine  Aged Out    Pneumococcal 0-64 years Vaccine  Aged Out    HIV screen  Discontinued    Cervical cancer screen  Discontinued     Immunization History   Administered Date(s) Administered    COVID-19, MODERNA BLUE border, Primary or Immunocompromised, (age 12y+), IM, 100 mcg/0.5mL 2021, 2021, 2022    Hep A, HAVRIX, VAQTA, (age 17m-24y), IM, 0.5mL

## 2023-11-09 ASSESSMENT — ENCOUNTER SYMPTOMS
RESPIRATORY NEGATIVE: 1
GASTROINTESTINAL NEGATIVE: 1
EYES NEGATIVE: 1

## 2023-11-13 ENCOUNTER — OFFICE VISIT (OUTPATIENT)
Dept: DERMATOLOGY | Age: 53
End: 2023-11-13
Payer: COMMERCIAL

## 2023-11-13 DIAGNOSIS — D22.9 BENIGN NEVUS: ICD-10-CM

## 2023-11-13 DIAGNOSIS — L71.9 ROSACEA: Primary | ICD-10-CM

## 2023-11-13 DIAGNOSIS — Z85.828 HISTORY OF BASAL CELL CANCER: ICD-10-CM

## 2023-11-13 PROCEDURE — 99214 OFFICE O/P EST MOD 30 MIN: CPT | Performed by: DERMATOLOGY

## 2023-11-13 NOTE — PROGRESS NOTES
[facility]) Dermatology  Yoana Diaz M.D.  [phone]       Deidre Holden  1970    48 y.o. female     Date of Visit: 11/13/2023    Chief Complaint:   Chief Complaint   Patient presents with    Skin Lesion        I was asked to see this patient by Dr. Tsang ref. provider found. History of Present Illness:  1. TBSE    Multiple nevi. Patient has not noticed any new or changing pigmented lesions. Stable in size, shape, color. Not itching, bleeding. Rosacea-stable. Has some persistent background erythema of both cheeks and scar at site of prior basal cell carcinoma but no active inflammatory papules or cysts. Currently on spironolactone 75 mg p.o. nightly. Is using MetroCream 0.75% approximately once daily or every other day. Very happy with her improvement. Sister with a history of basal cell carcinoma. Parents may also have a history of nonmelanoma skin cancer    Very careful about hats and sunscreen-wears a rash guard/UPF clothing, does spend time outside     [facility] initially treated with doxycycline 100 mg p.o. twice daily-developed vaginal yeast and discontinued 1/23, 11/22 spironolactone added 75 mg p.o. 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     Social History:   Social History     Socioeconomic History    Marital status:      Spouse name: Not on file    Number of children: Not on file    Years of education: Not on file    Highest education level: Not on file   Occupational History    Not on file   Tobacco Use    Smoking status: Former     Packs/day: 1.00     Years: 20.00

## 2024-02-24 ENCOUNTER — PATIENT MESSAGE (OUTPATIENT)
Dept: INTERNAL MEDICINE CLINIC | Age: 54
End: 2024-02-24

## 2024-05-13 RX ORDER — SPIRONOLACTONE 25 MG/1
TABLET ORAL
Qty: 90 TABLET | Refills: 6 | Status: SHIPPED | OUTPATIENT
Start: 2024-05-13

## 2024-06-12 ENCOUNTER — OFFICE VISIT (OUTPATIENT)
Dept: DERMATOLOGY | Age: 54
End: 2024-06-12
Payer: COMMERCIAL

## 2024-06-12 DIAGNOSIS — B02.9 VZV (VARICELLA-ZOSTER VIRUS) INFECTION: ICD-10-CM

## 2024-06-12 DIAGNOSIS — D48.5 NEOPLASM OF UNCERTAIN BEHAVIOR OF SKIN: Primary | ICD-10-CM

## 2024-06-12 DIAGNOSIS — Z85.828 HISTORY OF BASAL CELL CANCER: ICD-10-CM

## 2024-06-12 DIAGNOSIS — L71.9 ROSACEA: ICD-10-CM

## 2024-06-12 PROCEDURE — 11102 TANGNTL BX SKIN SINGLE LES: CPT | Performed by: DERMATOLOGY

## 2024-06-12 PROCEDURE — 99214 OFFICE O/P EST MOD 30 MIN: CPT | Performed by: DERMATOLOGY

## 2024-06-12 RX ORDER — PREDNISONE 10 MG/1
TABLET ORAL
Qty: 40 TABLET | Refills: 0 | Status: SHIPPED | OUTPATIENT
Start: 2024-06-12

## 2024-06-12 NOTE — PROGRESS NOTES
[facility] Dermatology  Jacy Blair M.D.  [phone]       Shantal Lynch  1970    53 y.o. female     Date of Visit: 6/12/2024    Chief Complaint:   Chief Complaint   Patient presents with    Skin Lesion        I was asked to see this patient by Dr. Tsang ref. provider found.    History of Present Illness:  1. TBSE    Multiple nevi. Patient has not noticed any new or changing pigmented lesions.   Stable in size, shape, color. Not itching, bleeding.      Rosacea-stable on spironolactone 75 mg p.o. daily, using MetroCream 0.75% about 3 times per week although increases for flares.  Does have resolving inflammatory papule right cheek    Varicella-zoster left abdomen extending to her back.  Was seen at [facility] and started on Valtrex-still taking Valtrex.  Very painful.  Difficulty sleeping.  Has tried gabapentin previously, and does not like side effects.         Skin history:  Sister with a history of basal cell carcinoma.  Parents may also have a history of nonmelanoma skin cancer     Very careful about hats and sunscreen-wears a rash guard/UPF clothing, does spend time outside     Rosacea-11/22 initially treated with doxycycline 100 mg p.o. twice daily-developed vaginal yeast and discontinued 1/23, 11/22 spironolactone added 75 mg p.o. 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    Social History:   Social History     Socioeconomic History    Marital status:      Spouse name: Not on file    Number of children: Not on file    Years of education: Not on file    Highest education level: Not on file

## 2024-06-14 LAB — DERMATOLOGY PATHOLOGY REPORT: ABNORMAL

## 2024-09-26 ENCOUNTER — OFFICE VISIT (OUTPATIENT)
Dept: DERMATOLOGY | Age: 54
End: 2024-09-26
Payer: COMMERCIAL

## 2024-09-26 DIAGNOSIS — L71.9 ROSACEA: ICD-10-CM

## 2024-09-26 DIAGNOSIS — C44.619 BASAL CELL CARCINOMA (BCC) OF LEFT SHOULDER: Primary | ICD-10-CM

## 2024-09-26 PROCEDURE — 99214 OFFICE O/P EST MOD 30 MIN: CPT | Performed by: DERMATOLOGY

## 2024-09-26 PROCEDURE — 17262 DSTRJ MAL LES T/A/L 1.1-2.0: CPT | Performed by: DERMATOLOGY

## 2024-12-11 ENCOUNTER — OFFICE VISIT (OUTPATIENT)
Dept: DERMATOLOGY | Age: 54
End: 2024-12-11
Payer: COMMERCIAL

## 2024-12-11 DIAGNOSIS — D22.9 BENIGN NEVUS: ICD-10-CM

## 2024-12-11 DIAGNOSIS — Z85.828 HISTORY OF BASAL CELL CANCER: ICD-10-CM

## 2024-12-11 DIAGNOSIS — R23.8 PAPULE: ICD-10-CM

## 2024-12-11 DIAGNOSIS — L71.9 ROSACEA: Primary | ICD-10-CM

## 2024-12-11 PROCEDURE — 99214 OFFICE O/P EST MOD 30 MIN: CPT | Performed by: DERMATOLOGY

## 2024-12-11 NOTE — PROGRESS NOTES
round and oval symmetric smoothly-bordered uniformly brown macules and papules.  0.15 cm hypopigmented papule left nose  Well-healed scars no sign of recurrence.  Less than 1+ inflammatory acne    Assessment and Plan     1. Rosacea-continue spironolactone 75 mg p.o. nightly, MetroCream 0.75% daily to twice daily   2. Benign nevus - Benign acquired melanocytic nevi  -Recommend monthly self skin exams   -Educated regarding the ABCDEs of melanoma detection   -Call for any new/changing moles or concerning lesions  -Reviewed sun protective behavior -- sun avoidance during the peak hours of the day, sun-protective clothing (including hat and sunglasses), sunscreen use (water resistant, broad spectrum, SPF at least 30, need for reapplication every 2 to 3 hours), avoidance of tanning beds      3. Papule-discussed papule left nose-differential diagnosis includes fibrous papule, early basal cell carcinoma.  Monitor for change-she will let me know if lesion changes or grows   4. History of basal cell cancer - No evidence of recurrence. Discussed risk of subsequent skin cancers and increased risk of melanoma. Reviewed importance of monitoring skin for change and sun protection with hats and sunscreen, as well as sun avoidance.    Of note, patient requires a referral to neurology in order to reestablish care and her primary care provider moved to a [facility] practice.  Referral sent to [place] for her to reestablish care-discussed that I am not involved with her neurology care or primary care.

## 2024-12-12 DIAGNOSIS — G35 MULTIPLE SCLEROSIS (HCC): Primary | ICD-10-CM

## 2025-01-15 RX ORDER — SPIRONOLACTONE 25 MG/1
TABLET ORAL
Qty: 90 TABLET | Refills: 6 | Status: SHIPPED | OUTPATIENT
Start: 2025-01-15

## 2025-06-09 ENCOUNTER — OFFICE VISIT (OUTPATIENT)
Age: 55
End: 2025-06-09
Payer: COMMERCIAL

## 2025-06-09 DIAGNOSIS — D22.9 BENIGN NEVUS: ICD-10-CM

## 2025-06-09 DIAGNOSIS — L71.9 ROSACEA: Primary | ICD-10-CM

## 2025-06-09 DIAGNOSIS — R23.8 PAPULE: ICD-10-CM

## 2025-06-09 DIAGNOSIS — B35.1 ONYCHOMYCOSIS: ICD-10-CM

## 2025-06-09 DIAGNOSIS — Z85.828 HISTORY OF BASAL CELL CANCER: ICD-10-CM

## 2025-06-09 PROCEDURE — 99214 OFFICE O/P EST MOD 30 MIN: CPT | Performed by: DERMATOLOGY

## 2025-06-09 RX ORDER — CICLOPIROX 7.7 MG/G
GEL TOPICAL
Qty: 30 G | Refills: 4 | Status: SHIPPED | OUTPATIENT
Start: 2025-06-09

## 2025-06-09 NOTE — PROGRESS NOTES
[facility]  Jacy Blair M.D.  [phone]       Shantal Lynch  1970    54 y.o. female     Date of Visit: 6/9/2025    Chief Complaint:   Chief Complaint   Patient presents with    Skin Lesion        I was asked to see this patient by Dr. Tsang ref. provider found.    History of Present Illness:  1. TBSE    Multiple nevi. Patient has not noticed any new or changing pigmented lesions.   Stable in size, shape, color. Not itching, bleeding.     Distal subungual debris/hemorrhage both great toenails-has noted some distal onycholysis.  New in the last several months.  Not previously treated-noted when she removed her toenail polish.    Hypopigmented papule left ala-stable.  Has not noticed any change or growth    Continues on spironolactone 75 mg p.o. nightly and uses MetroCream as needed.  Stable.  Rare inflammatory outbreak    Skin history:  Sister with a history of basal cell carcinoma.  Parents may also have a history of nonmelanoma skin cancer     Very careful about hats and sunscreen-wears a rash guard/UPF clothing, does spend time outside     Rosacea-11/22 initially treated with doxycycline 100 mg p.o. twice daily-developed vaginal yeast and discontinued 1/23, 11/22 spironolactone added 75 mg p.o. 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    Social History:   Social History     Socioeconomic History    Marital status:      Spouse name: Not on file

## 2025-08-08 ENCOUNTER — PATIENT MESSAGE (OUTPATIENT)
Age: 55
End: 2025-08-08

## 2025-08-19 RX ORDER — SPIRONOLACTONE 25 MG/1
TABLET ORAL
Qty: 90 TABLET | Refills: 6 | Status: SHIPPED | OUTPATIENT
Start: 2025-08-19

## (undated) DEVICE — BIT DRL DIA2.5MM FOR ANK FRAC MGMT SYS

## (undated) DEVICE — Z DISCONTINUED USE 2275683 GLOVE SURG SZ 6.5 L12IN FNGR THK13MIL WHT ISOLEX

## (undated) DEVICE — PAD,NON-ADHERENT,3X8,STERILE,LF,1/PK: Brand: MEDLINE

## (undated) DEVICE — SUTURE VCRL + SZ 3-0 L18IN ABSRB UD SH 1/2 CIR TAPERCUT NDL VCP864D

## (undated) DEVICE — SPLINT THMB W4XL30IN FBRGLS PD PRECUT LTWT DURABLE FAST SET

## (undated) DEVICE — PACK EXTREMITY XR

## (undated) DEVICE — BIT DRL DIA2MM CALIB FOR ANK FRAC MGMT SYS

## (undated) DEVICE — GLOVE ORANGE PI 8   MSG9080

## (undated) DEVICE — SUTURE VCRL + SZ 4-0 L18IN ABSRB UD L19MM PS-2 3/8 CIR PRIM VCP496H

## (undated) DEVICE — DRESSING,GAUZE,XEROFORM,CURAD,5"X9",ST: Brand: CURAD

## (undated) DEVICE — SUTURE VCRL + SZ 2-0 L18IN ABSRB UD CT1 L36MM 1/2 CIR VCP839D

## (undated) DEVICE — Z DISCONTINUED GLOVE SURG SZ 7.5 L12IN FNGR THK13MIL WHT ISOLEX

## (undated) DEVICE — GOWN SIRUS NONREIN XL W/TWL: Brand: MEDLINE INDUSTRIES, INC.

## (undated) DEVICE — SOLUTION IV IRRIG POUR BRL 0.9% SODIUM CHL 2F7124

## (undated) DEVICE — BIT DRL DIA2.5MM LNG GRAD FOR ANK FRAC MGMT SYS

## (undated) DEVICE — PAD,ABDOMINAL,8"X10",ST,LF: Brand: MEDLINE

## (undated) DEVICE — STANDARD LATEX FREE WOVEN ELASTIC BANDAGE 62INX4.5YD

## (undated) DEVICE — Z DISCONTINUED GLOVE SURG SZ 7 L12IN FNGR THK13MIL WHT ISOLEX POLYISOPRENE

## (undated) DEVICE — CUSTOM PACK: Brand: UNBRANDED

## (undated) DEVICE — BIT DRL DIA3.5MM TRIM-IT

## (undated) DEVICE — SPONGE DRESSING CURITY 4X4IN 2 PLY NON

## (undated) DEVICE — Z DISCONTINUED USE 2275686 GLOVE SURG SZ 8 L12IN FNGR THK13MIL WHT ISOLEX POLYISOPRENE